# Patient Record
Sex: FEMALE | Race: WHITE | Employment: FULL TIME | URBAN - METROPOLITAN AREA
[De-identification: names, ages, dates, MRNs, and addresses within clinical notes are randomized per-mention and may not be internally consistent; named-entity substitution may affect disease eponyms.]

---

## 2017-04-26 ENCOUNTER — TELEPHONE (OUTPATIENT)
Dept: FAMILY MEDICINE CLINIC | Facility: CLINIC | Age: 47
End: 2017-04-26

## 2017-04-27 ENCOUNTER — TELEPHONE (OUTPATIENT)
Dept: FAMILY MEDICINE CLINIC | Facility: CLINIC | Age: 47
End: 2017-04-27

## 2017-05-12 ENCOUNTER — HOSPITAL ENCOUNTER (OUTPATIENT)
Dept: GENERAL RADIOLOGY | Age: 47
Discharge: HOME | End: 2017-05-12
Attending: FAMILY MEDICINE
Payer: COMMERCIAL

## 2017-05-12 ENCOUNTER — OFFICE VISIT (OUTPATIENT)
Dept: FAMILY MEDICINE CLINIC | Facility: CLINIC | Age: 47
End: 2017-05-12

## 2017-05-12 VITALS
HEIGHT: 69.5 IN | TEMPERATURE: 98 F | BODY MASS INDEX: 42.42 KG/M2 | DIASTOLIC BLOOD PRESSURE: 82 MMHG | HEART RATE: 72 BPM | WEIGHT: 293 LBS | SYSTOLIC BLOOD PRESSURE: 120 MMHG | RESPIRATION RATE: 16 BRPM

## 2017-05-12 DIAGNOSIS — Z15.01 BRCA2 GENE MUTATION POSITIVE: ICD-10-CM

## 2017-05-12 DIAGNOSIS — R05.8 PRODUCTIVE COUGH: ICD-10-CM

## 2017-05-12 DIAGNOSIS — Z01.818 PREOP EXAMINATION: ICD-10-CM

## 2017-05-12 DIAGNOSIS — Z15.09 BRCA2 GENE MUTATION POSITIVE: ICD-10-CM

## 2017-05-12 DIAGNOSIS — R74.8 ELEVATED ALKALINE PHOSPHATASE MEASUREMENT: ICD-10-CM

## 2017-05-12 DIAGNOSIS — Z01.818 PREOP EXAMINATION: Primary | ICD-10-CM

## 2017-05-12 PROBLEM — Z83.2 FAMILY HISTORY OF BLOOD DYSCRASIA: Status: ACTIVE | Noted: 2017-05-12

## 2017-05-12 PROCEDURE — 71020 XR CHEST PA + LAT CHEST (CPT=71020): CPT | Performed by: FAMILY MEDICINE

## 2017-05-12 PROCEDURE — 36415 COLL VENOUS BLD VENIPUNCTURE: CPT | Performed by: FAMILY MEDICINE

## 2017-05-12 PROCEDURE — 80053 COMPREHEN METABOLIC PANEL: CPT | Performed by: FAMILY MEDICINE

## 2017-05-12 PROCEDURE — 85730 THROMBOPLASTIN TIME PARTIAL: CPT | Performed by: FAMILY MEDICINE

## 2017-05-12 PROCEDURE — 93000 ELECTROCARDIOGRAM COMPLETE: CPT | Performed by: FAMILY MEDICINE

## 2017-05-12 PROCEDURE — 99244 OFF/OP CNSLTJ NEW/EST MOD 40: CPT | Performed by: FAMILY MEDICINE

## 2017-05-12 PROCEDURE — 84080 ASSAY ALKALINE PHOSPHATASES: CPT | Performed by: FAMILY MEDICINE

## 2017-05-12 PROCEDURE — 85610 PROTHROMBIN TIME: CPT | Performed by: FAMILY MEDICINE

## 2017-05-12 PROCEDURE — 85027 COMPLETE CBC AUTOMATED: CPT | Performed by: FAMILY MEDICINE

## 2017-05-12 RX ORDER — AZITHROMYCIN 250 MG/1
TABLET, FILM COATED ORAL
Qty: 6 TABLET | Refills: 0 | Status: SHIPPED | OUTPATIENT
Start: 2017-05-12 | End: 2017-05-17

## 2017-05-12 NOTE — PROGRESS NOTES
Marleni Hernandez is a 52year old female.     CC:  Patient presents with:  Pre-Op Exam: per pt      HPI:  I am seeing Marleni Hernandez for preoperative evaluation at the request of Dr. Zehra Gamino M.D. for my evaluation of the patient's medical problems BS+, soft, nondistended; no HSM; no masses; no bruits; no masses; nontender, no G/R/R   PSYCH: alert and oriented x 3; affect appropriate  SKIN: not examined  BREAST: not examined/not applicable  EXTREMITIES: No clubbing, cyanosis or edema  RECTAL: not exa I34.575 Encounter for othe*      COMPARISON:  None.      TECHNIQUE:  PA and lateral chest radiographs were obtained.      PATIENT STATED HISTORY: (As transcribed by Technologist)  Presurgical clearance no respiratory complaints at this time.  Having a hyste placement on 7/31/17 at [facility] by Chaitanya Galvez and Hong. Her health status is unchanged from initial preoperative consultation and is at an acceptable risk for surgery. . Please consider her cleared for this new proposed procedure.         Lucas Bonilla

## 2017-05-31 ENCOUNTER — TELEPHONE (OUTPATIENT)
Dept: FAMILY MEDICINE CLINIC | Facility: CLINIC | Age: 47
End: 2017-05-31

## 2017-06-16 ENCOUNTER — OFFICE VISIT (OUTPATIENT)
Dept: SURGERY | Facility: CLINIC | Age: 47
End: 2017-06-16

## 2017-06-16 VITALS
HEART RATE: 79 BPM | TEMPERATURE: 98 F | SYSTOLIC BLOOD PRESSURE: 150 MMHG | BODY MASS INDEX: 41.95 KG/M2 | WEIGHT: 293 LBS | DIASTOLIC BLOOD PRESSURE: 92 MMHG | HEIGHT: 70 IN

## 2017-06-16 DIAGNOSIS — Z15.01 BRCA2 GENE MUTATION POSITIVE: ICD-10-CM

## 2017-06-16 DIAGNOSIS — Z15.09 BRCA2 GENE MUTATION POSITIVE: ICD-10-CM

## 2017-06-16 DIAGNOSIS — Z01.818 PRE-OP TESTING: Primary | ICD-10-CM

## 2017-06-16 PROCEDURE — 99243 OFF/OP CNSLTJ NEW/EST LOW 30: CPT | Performed by: SURGERY

## 2017-06-16 RX ORDER — BUTALBITAL, ACETAMINOPHEN AND CAFFEINE 50; 325; 40 MG/1; MG/1; MG/1
1 TABLET ORAL EVERY 4 HOURS PRN
COMMUNITY
End: 2017-06-27 | Stop reason: ALTCHOICE

## 2017-06-16 RX ORDER — GARLIC EXTRACT 500 MG
1 CAPSULE ORAL DAILY
COMMUNITY
End: 2017-10-27

## 2017-06-16 RX ORDER — IBUPROFEN 600 MG/1
600 TABLET ORAL EVERY 6 HOURS PRN
COMMUNITY
End: 2017-06-27 | Stop reason: ALTCHOICE

## 2017-06-16 RX ORDER — AMOXICILLIN 250 MG
CAPSULE ORAL
COMMUNITY
End: 2017-10-27

## 2017-06-16 RX ORDER — ONDANSETRON 4 MG/1
4 TABLET, ORALLY DISINTEGRATING ORAL EVERY 8 HOURS PRN
COMMUNITY
End: 2017-06-27 | Stop reason: ALTCHOICE

## 2017-06-16 NOTE — CONSULTS
New Patient Consultation    Chief Complaint: BRCA 2 mutation  History of Present Illness:   Ramon Cardona is a 52year old female referred by Dr. Hector Sheppard to discuss autologous breast reconstruction.   The report the patient reports that her mother was [facility] Rfl:    Butalbital-APAP-Caffeine -40 MG Oral Tab Take 1 tablet by mouth every 4 (four) hours as needed for Pain. Disp:  Rfl:    ondansetron 4 MG Oral Tablet Dispersible Take 4 mg by mouth every 8 (eight) hours as needed for Nausea.  Disp:  Rfl: Genitourinary:  The patient denies frequent urination, needing to get up at night to urinate, urinary hesitancy or retaining urine, painful urination, urinary incontinence, decreased urine stream, blood in the urine, or vaginal/penile discharge.   Skin: respiratory effort. Cardiovascular: no cyanosis, no edema    Gastrointestinal: no hernias, infraumbilical pannus large, hyperectomy incision CDI    Lymphatic:  There is no cervical, supraclavicular, or axillary lymphadenopathy appreciated.     [address] a CTA to evaluate her perforators as well as her abdominal pannus thickness. She also undergo an MRI which was ordered by her previous breast surgeon which ideally will be done prior to seeing Dr. Kallie Melendez.        Furthermore we reviewed the options for post was discussed including the need for activity limitation, drains, and suture removal. Finally, we discussed the possible need for revisions, the process of nipple areolar reconstruction, and a contralateral balancing procedure (augmentation, mastopexy, red

## 2017-06-20 ENCOUNTER — TELEPHONE (OUTPATIENT)
Dept: FAMILY MEDICINE CLINIC | Facility: CLINIC | Age: 47
End: 2017-06-20

## 2017-06-26 ENCOUNTER — MED REC SCAN ONLY (OUTPATIENT)
Dept: FAMILY MEDICINE CLINIC | Facility: CLINIC | Age: 47
End: 2017-06-26

## 2017-06-27 ENCOUNTER — HOSPITAL ENCOUNTER (OUTPATIENT)
Dept: CT IMAGING | Facility: HOSPITAL | Age: 47
Discharge: HOME | End: 2017-06-27
Attending: SURGERY
Payer: COMMERCIAL

## 2017-06-27 ENCOUNTER — OFFICE VISIT (OUTPATIENT)
Dept: SURGERY | Facility: CLINIC | Age: 47
End: 2017-06-27

## 2017-06-27 VITALS
DIASTOLIC BLOOD PRESSURE: 92 MMHG | SYSTOLIC BLOOD PRESSURE: 144 MMHG | HEART RATE: 71 BPM | WEIGHT: 293 LBS | RESPIRATION RATE: 20 BRPM | BODY MASS INDEX: 42 KG/M2 | OXYGEN SATURATION: 96 % | TEMPERATURE: 98 F

## 2017-06-27 DIAGNOSIS — Z15.09 BRCA2 GENE MUTATION POSITIVE: Primary | ICD-10-CM

## 2017-06-27 DIAGNOSIS — F41.9 ANXIETY: ICD-10-CM

## 2017-06-27 DIAGNOSIS — Z15.01 BRCA2 GENE MUTATION POSITIVE: Primary | ICD-10-CM

## 2017-06-27 DIAGNOSIS — Z01.818 PRE-OP TESTING: ICD-10-CM

## 2017-06-27 PROCEDURE — 74174 CTA ABD&PLVS W/CONTRAST: CPT | Performed by: SURGERY

## 2017-06-27 PROCEDURE — 99245 OFF/OP CONSLTJ NEW/EST HI 55: CPT | Performed by: SURGERY

## 2017-06-27 RX ORDER — DIAZEPAM 5 MG/1
TABLET ORAL EVERY 8 HOURS PRN
Qty: 5 TABLET | Refills: 0 | Status: SHIPPED | OUTPATIENT
Start: 2017-06-27 | End: 2017-07-24 | Stop reason: ALTCHOICE

## 2017-06-29 NOTE — PROGRESS NOTES
Breast Surgery New Patient Consultation    This is the first visit for this 52year old woman, referred by Dr. Natividad Velasco, who presents for evaluation of BRCA2 positive.     History of Present Illness:   Ms. Myra Abad is a 52year old woman who presents w total) by mouth every 8 (eight) hours as needed for Anxiety. Disp: 5 tablet Rfl: 0   Multiple Vitamins-Minerals (BIOTIN PLUS/CALCIUM/VIT D3) Oral Tab Take by mouth. Disp:  Rfl:    Bromelains 100 MG Oral Tab Take by mouth.  Disp:  Rfl:    Acidophilus/Pectin of the skin, indigestion, nausea, change in bowel habits, diarrhea, abdominal pain or vomiting blood.      Genitourinary:  The patient denies frequent urination, needing to get up at night to urinate, urinary hesitancy or retaining urine, painful urination, Conjunctiva are clear, non-icteric. Chest: The chest expands symmetrically. The lungs are clear to auscultation. Heart: The rhythm is regular. There are no murmurs, rubs, gallops or thrills.     Breasts:  Her breasts are symmetrical with a cup size F MRI results when they are available and her surgery will be planned according to the weight loss goals. The risks and possible complications of the procedure were explained to the patient and her family and she understood and agreed to the proposed plan.  Leo Morris

## 2017-07-05 ENCOUNTER — TELEPHONE (OUTPATIENT)
Dept: SURGERY | Facility: CLINIC | Age: 47
End: 2017-07-05

## 2017-07-05 NOTE — TELEPHONE ENCOUNTER
I spoke to the pt today discussing the CTA results. Her flap thickness is ranging from 5.3-7.6cm and that is increasing the risk of fat necrosis, wound dehiscence, pulling on the flap and anstomosis and heaviness of her reconstructed breasts.    I therefore

## 2017-07-13 ENCOUNTER — TELEPHONE (OUTPATIENT)
Dept: SURGERY | Facility: CLINIC | Age: 47
End: 2017-07-13

## 2017-07-13 ENCOUNTER — TELEPHONE (OUTPATIENT)
Dept: FAMILY MEDICINE CLINIC | Facility: CLINIC | Age: 47
End: 2017-07-13

## 2017-07-13 NOTE — TELEPHONE ENCOUNTER
Seen on 06/01/17 for Hysterectomy and is BRCA 2 +. Is having a double Mastectomy and expanders on 07/31/17 at [facility] by Dr Oneida France and Dr Anselmo Hall. Can you put in an updated H&P into the system clearing her for this surgery.  Surgeon states that her

## 2017-07-13 NOTE — TELEPHONE ENCOUNTER
Called pt to confirm surgery date of 7/31/17 at [facility] for Ruddy. Pippa Olmos and [employer] Answered pt questions. She will come in on 7/24/17 to sign Plastic Surgery consents with RN/MA. Pt knows she needs to obtain pre-op H&P and medical clearance for surgery.  L

## 2017-07-13 NOTE — TELEPHONE ENCOUNTER
Spoke with Larry Ortiz at Dr. Chika Mallory office - she is going to check into wether patient needs to be cleared again or if the H & P from 5 12 2017 - can be updated

## 2017-07-13 NOTE — TELEPHONE ENCOUNTER
She was last seen by me on 5/12/17. Will the surgeon and the surgical facility accept that it will be over 2 months since the last OV. I can update my note if everyone is fine with it.

## 2017-07-13 NOTE — TELEPHONE ENCOUNTER
Emelyn Wren called back the office and she states that it is fine to update the patients H& P from 5 12 2017 -  The procedure just needs to be updated - patient is having:  Bilateral Mastectomy-   Immediate Bilateral Breast Reconstruction and tissue expander place

## 2017-07-17 ENCOUNTER — TELEPHONE (OUTPATIENT)
Dept: SURGERY | Facility: CLINIC | Age: 47
End: 2017-07-17

## 2017-07-17 NOTE — TELEPHONE ENCOUNTER
Patient calling with request to secure a December date for second stage surgery for delayed autologous reconstruction. Per Dr. Loren Cole, we will wait to hold any dates at this time.  Answered pt questions and she will discuss with Dr. Loren Cole at first post op rider

## 2017-07-24 ENCOUNTER — NURSE ONLY (OUTPATIENT)
Dept: SURGERY | Facility: CLINIC | Age: 47
End: 2017-07-24

## 2017-07-24 ENCOUNTER — TELEPHONE (OUTPATIENT)
Dept: FAMILY MEDICINE CLINIC | Facility: CLINIC | Age: 47
End: 2017-07-24

## 2017-07-24 NOTE — OR PREOP
Spoke with Mary Douglas RN with Dr. Mavis Melgar, states it is acceptable to  start IV in either upper extremity.

## 2017-07-24 NOTE — PROGRESS NOTES
Faxed EPIC request for medical clearance to Dr. Mili Silva' office, fax confirmation page received. I will anticipate the clearance.

## 2017-07-24 NOTE — TELEPHONE ENCOUNTER
Provider called, she cannot see the last EKG for pt. Can we fax her a copy at [phone]?   Please call Arianna Espinal at [phone]

## 2017-07-24 NOTE — PROGRESS NOTES
The patient presents today for a pre-op appointment. Surgery and wash instructions verbally reviewed with the patient and she was provided with written instructions.  Informed consent for the procedure reviewed and signed by the patient in the office, all q

## 2017-07-26 ENCOUNTER — TELEPHONE (OUTPATIENT)
Dept: SURGERY | Facility: CLINIC | Age: 47
End: 2017-07-26

## 2017-07-26 DIAGNOSIS — K59.03 DRUG-INDUCED CONSTIPATION: ICD-10-CM

## 2017-07-26 DIAGNOSIS — G89.18 POST-OP PAIN: Primary | ICD-10-CM

## 2017-07-26 RX ORDER — HYDROCODONE BITARTRATE AND ACETAMINOPHEN 5; 325 MG/1; MG/1
1-2 TABLET ORAL EVERY 4 HOURS PRN
Qty: 30 TABLET | Refills: 0 | Status: ON HOLD | OUTPATIENT
Start: 2017-07-26 | End: 2017-08-01

## 2017-07-26 RX ORDER — DOCUSATE SODIUM 100 MG/1
100 CAPSULE, LIQUID FILLED ORAL 2 TIMES DAILY
Qty: 60 CAPSULE | Refills: 0 | Status: SHIPPED | OUTPATIENT
Start: 2017-07-26 | End: 2017-09-18 | Stop reason: ALTCHOICE

## 2017-07-26 NOTE — PROGRESS NOTES
Pre-op medications for Norco #30 and Colace #60 ordered in preparation for the patient's procedure this week per Dr. Aye BARRERA.

## 2017-07-26 NOTE — TELEPHONE ENCOUNTER
Pre-op medications for Norco #30 and Colace #60 ordered in preparation for the patient's procedure this week per Dr. Tomas BARRERA.    The patient was notified and preferred to have these medication orders mailed to her, confirmed her home address and advised

## 2017-07-31 ENCOUNTER — SURGERY (OUTPATIENT)
Age: 47
End: 2017-07-31

## 2017-07-31 ENCOUNTER — HOSPITAL ENCOUNTER (INPATIENT)
Facility: HOSPITAL | Age: 47
LOS: 2 days | Discharge: HOME | DRG: 584 | End: 2017-08-02
Attending: SURGERY | Admitting: SURGERY
Payer: COMMERCIAL

## 2017-07-31 ENCOUNTER — ANESTHESIA (OUTPATIENT)
Dept: SURGERY | Facility: HOSPITAL | Age: 47
DRG: 584 | End: 2017-07-31
Payer: COMMERCIAL

## 2017-07-31 ENCOUNTER — ANESTHESIA EVENT (OUTPATIENT)
Dept: SURGERY | Facility: HOSPITAL | Age: 47
DRG: 584 | End: 2017-07-31
Payer: COMMERCIAL

## 2017-07-31 DIAGNOSIS — Z15.01 BRCA2 GENE MUTATION POSITIVE: Primary | ICD-10-CM

## 2017-07-31 DIAGNOSIS — Z15.09 BRCA2 GENE MUTATION POSITIVE: Primary | ICD-10-CM

## 2017-07-31 DIAGNOSIS — G89.18 POST-OP PAIN: ICD-10-CM

## 2017-07-31 PROBLEM — Z90.13 ABSENCE OF BOTH BREASTS: Status: ACTIVE | Noted: 2017-07-31

## 2017-07-31 PROCEDURE — 88305 TISSUE EXAM BY PATHOLOGIST: CPT | Performed by: SURGERY

## 2017-07-31 PROCEDURE — 88307 TISSUE EXAM BY PATHOLOGIST: CPT | Performed by: SURGERY

## 2017-07-31 PROCEDURE — 0HRV0JZ REPLACEMENT OF BILATERAL BREAST WITH SYNTHETIC SUBSTITUTE, OPEN APPROACH: ICD-10-PCS | Performed by: SURGERY

## 2017-07-31 DEVICE — GRAFT ALLODERM CONTOUR LG PERF: Type: IMPLANTABLE DEVICE | Site: BREAST | Status: FUNCTIONAL

## 2017-07-31 DEVICE — IMPLANTABLE DEVICE: Type: IMPLANTABLE DEVICE | Site: BREAST | Status: FUNCTIONAL

## 2017-07-31 RX ORDER — HYDROCODONE BITARTRATE AND ACETAMINOPHEN 5; 325 MG/1; MG/1
1 TABLET ORAL EVERY 6 HOURS PRN
Status: DISCONTINUED | OUTPATIENT
Start: 2017-07-31 | End: 2017-08-01

## 2017-07-31 RX ORDER — DEXAMETHASONE SODIUM PHOSPHATE 4 MG/ML
VIAL (ML) INJECTION AS NEEDED
Status: DISCONTINUED | OUTPATIENT
Start: 2017-07-31 | End: 2017-07-31 | Stop reason: SURG

## 2017-07-31 RX ORDER — ACETAMINOPHEN 325 MG/1
650 TABLET ORAL ONCE
Status: COMPLETED | OUTPATIENT
Start: 2017-07-31 | End: 2017-07-31

## 2017-07-31 RX ORDER — SODIUM CHLORIDE, SODIUM LACTATE, POTASSIUM CHLORIDE, CALCIUM CHLORIDE 600; 310; 30; 20 MG/100ML; MG/100ML; MG/100ML; MG/100ML
INJECTION, SOLUTION INTRAVENOUS CONTINUOUS
Status: DISCONTINUED | OUTPATIENT
Start: 2017-07-31 | End: 2017-08-02

## 2017-07-31 RX ORDER — DOCUSATE SODIUM 100 MG/1
100 CAPSULE, LIQUID FILLED ORAL 2 TIMES DAILY
Status: DISCONTINUED | OUTPATIENT
Start: 2017-07-31 | End: 2017-08-02

## 2017-07-31 RX ORDER — METHYLENE BLUE 10 MG/ML
INJECTION INTRAVENOUS AS NEEDED
Status: DISCONTINUED | OUTPATIENT
Start: 2017-07-31 | End: 2017-07-31 | Stop reason: HOSPADM

## 2017-07-31 RX ORDER — BUPIVACAINE HYDROCHLORIDE 5 MG/ML
INJECTION, SOLUTION EPIDURAL; INTRACAUDAL AS NEEDED
Status: DISCONTINUED | OUTPATIENT
Start: 2017-07-31 | End: 2017-07-31 | Stop reason: HOSPADM

## 2017-07-31 RX ORDER — NALOXONE HYDROCHLORIDE 0.4 MG/ML
80 INJECTION, SOLUTION INTRAMUSCULAR; INTRAVENOUS; SUBCUTANEOUS AS NEEDED
Status: ACTIVE | OUTPATIENT
Start: 2017-07-31 | End: 2017-07-31

## 2017-07-31 RX ORDER — NEOSTIGMINE METHYLSULFATE 0.5 MG/ML
INJECTION INTRAVENOUS AS NEEDED
Status: DISCONTINUED | OUTPATIENT
Start: 2017-07-31 | End: 2017-07-31 | Stop reason: SURG

## 2017-07-31 RX ORDER — HYDROMORPHONE HYDROCHLORIDE 1 MG/ML
0.2 INJECTION, SOLUTION INTRAMUSCULAR; INTRAVENOUS; SUBCUTANEOUS EVERY 5 MIN PRN
Status: DISCONTINUED | OUTPATIENT
Start: 2017-07-31 | End: 2017-07-31 | Stop reason: HOSPADM

## 2017-07-31 RX ORDER — MORPHINE SULFATE 4 MG/ML
6 INJECTION, SOLUTION INTRAMUSCULAR; INTRAVENOUS EVERY 10 MIN PRN
Status: DISCONTINUED | OUTPATIENT
Start: 2017-07-31 | End: 2017-07-31 | Stop reason: HOSPADM

## 2017-07-31 RX ORDER — HYDROMORPHONE HYDROCHLORIDE 1 MG/ML
0.6 INJECTION, SOLUTION INTRAMUSCULAR; INTRAVENOUS; SUBCUTANEOUS EVERY 5 MIN PRN
Status: DISCONTINUED | OUTPATIENT
Start: 2017-07-31 | End: 2017-07-31 | Stop reason: HOSPADM

## 2017-07-31 RX ORDER — METOCLOPRAMIDE 10 MG/1
10 TABLET ORAL ONCE
Status: COMPLETED | OUTPATIENT
Start: 2017-07-31 | End: 2017-07-31

## 2017-07-31 RX ORDER — ENOXAPARIN SODIUM 100 MG/ML
40 INJECTION SUBCUTANEOUS DAILY
Status: DISCONTINUED | OUTPATIENT
Start: 2017-08-01 | End: 2017-08-02

## 2017-07-31 RX ORDER — ONDANSETRON 2 MG/ML
INJECTION INTRAMUSCULAR; INTRAVENOUS AS NEEDED
Status: DISCONTINUED | OUTPATIENT
Start: 2017-07-31 | End: 2017-07-31 | Stop reason: SURG

## 2017-07-31 RX ORDER — HYDROMORPHONE HYDROCHLORIDE 1 MG/ML
0.4 INJECTION, SOLUTION INTRAMUSCULAR; INTRAVENOUS; SUBCUTANEOUS EVERY 5 MIN PRN
Status: DISCONTINUED | OUTPATIENT
Start: 2017-07-31 | End: 2017-07-31 | Stop reason: HOSPADM

## 2017-07-31 RX ORDER — MORPHINE SULFATE 2 MG/ML
2 INJECTION, SOLUTION INTRAMUSCULAR; INTRAVENOUS EVERY 10 MIN PRN
Status: DISCONTINUED | OUTPATIENT
Start: 2017-07-31 | End: 2017-07-31 | Stop reason: HOSPADM

## 2017-07-31 RX ORDER — HYDROCODONE BITARTRATE AND ACETAMINOPHEN 5; 325 MG/1; MG/1
1 TABLET ORAL AS NEEDED
Status: DISCONTINUED | OUTPATIENT
Start: 2017-07-31 | End: 2017-07-31 | Stop reason: HOSPADM

## 2017-07-31 RX ORDER — CEPHALEXIN 500 MG/1
500 CAPSULE ORAL EVERY 6 HOURS SCHEDULED
Status: DISCONTINUED | OUTPATIENT
Start: 2017-08-01 | End: 2017-08-02

## 2017-07-31 RX ORDER — HYDROCODONE BITARTRATE AND ACETAMINOPHEN 5; 325 MG/1; MG/1
2 TABLET ORAL AS NEEDED
Status: DISCONTINUED | OUTPATIENT
Start: 2017-07-31 | End: 2017-07-31 | Stop reason: HOSPADM

## 2017-07-31 RX ORDER — MORPHINE SULFATE 4 MG/ML
4 INJECTION, SOLUTION INTRAMUSCULAR; INTRAVENOUS EVERY 10 MIN PRN
Status: DISCONTINUED | OUTPATIENT
Start: 2017-07-31 | End: 2017-07-31 | Stop reason: HOSPADM

## 2017-07-31 RX ORDER — ONDANSETRON 2 MG/ML
4 INJECTION INTRAMUSCULAR; INTRAVENOUS ONCE AS NEEDED
Status: DISCONTINUED | OUTPATIENT
Start: 2017-07-31 | End: 2017-07-31 | Stop reason: HOSPADM

## 2017-07-31 RX ORDER — MIDAZOLAM HYDROCHLORIDE 1 MG/ML
INJECTION INTRAMUSCULAR; INTRAVENOUS AS NEEDED
Status: DISCONTINUED | OUTPATIENT
Start: 2017-07-31 | End: 2017-07-31 | Stop reason: SURG

## 2017-07-31 RX ORDER — GLYCOPYRROLATE 0.2 MG/ML
INJECTION INTRAMUSCULAR; INTRAVENOUS AS NEEDED
Status: DISCONTINUED | OUTPATIENT
Start: 2017-07-31 | End: 2017-07-31 | Stop reason: SURG

## 2017-07-31 RX ORDER — FAMOTIDINE 20 MG/1
20 TABLET ORAL ONCE
Status: COMPLETED | OUTPATIENT
Start: 2017-07-31 | End: 2017-07-31

## 2017-07-31 RX ORDER — KETAMINE HCL IN 0.9 % NACL 100MG/10ML
SYRINGE (ML) INTRAVENOUS AS NEEDED
Status: DISCONTINUED | OUTPATIENT
Start: 2017-07-31 | End: 2017-07-31 | Stop reason: SURG

## 2017-07-31 RX ORDER — LIDOCAINE HYDROCHLORIDE 10 MG/ML
INJECTION, SOLUTION EPIDURAL; INFILTRATION; INTRACAUDAL; PERINEURAL AS NEEDED
Status: DISCONTINUED | OUTPATIENT
Start: 2017-07-31 | End: 2017-07-31 | Stop reason: SURG

## 2017-07-31 RX ORDER — ROCURONIUM BROMIDE 10 MG/ML
INJECTION, SOLUTION INTRAVENOUS AS NEEDED
Status: DISCONTINUED | OUTPATIENT
Start: 2017-07-31 | End: 2017-07-31 | Stop reason: SURG

## 2017-07-31 RX ORDER — ONDANSETRON 2 MG/ML
4 INJECTION INTRAMUSCULAR; INTRAVENOUS EVERY 6 HOURS PRN
Status: DISCONTINUED | OUTPATIENT
Start: 2017-07-31 | End: 2017-08-02

## 2017-07-31 RX ADMIN — ROCURONIUM BROMIDE 20 MG: 10 INJECTION, SOLUTION INTRAVENOUS at 09:15:00

## 2017-07-31 RX ADMIN — DEXAMETHASONE SODIUM PHOSPHATE 4 MG: 4 MG/ML VIAL (ML) INJECTION at 08:09:00

## 2017-07-31 RX ADMIN — KETAMINE HCL IN 0.9 % NACL 30 MG: 100MG/10ML SYRINGE (ML) INTRAVENOUS at 08:14:00

## 2017-07-31 RX ADMIN — SODIUM CHLORIDE, SODIUM LACTATE, POTASSIUM CHLORIDE, CALCIUM CHLORIDE: 600; 310; 30; 20 INJECTION, SOLUTION INTRAVENOUS at 13:09:00

## 2017-07-31 RX ADMIN — ONDANSETRON 4 MG: 2 INJECTION INTRAMUSCULAR; INTRAVENOUS at 08:09:00

## 2017-07-31 RX ADMIN — SODIUM CHLORIDE, SODIUM LACTATE, POTASSIUM CHLORIDE, CALCIUM CHLORIDE: 600; 310; 30; 20 INJECTION, SOLUTION INTRAVENOUS at 13:05:00

## 2017-07-31 RX ADMIN — SODIUM CHLORIDE, SODIUM LACTATE, POTASSIUM CHLORIDE, CALCIUM CHLORIDE: 600; 310; 30; 20 INJECTION, SOLUTION INTRAVENOUS at 08:02:00

## 2017-07-31 RX ADMIN — MIDAZOLAM HYDROCHLORIDE 2 MG: 1 INJECTION INTRAMUSCULAR; INTRAVENOUS at 08:03:00

## 2017-07-31 RX ADMIN — LIDOCAINE HYDROCHLORIDE 50 MG: 10 INJECTION, SOLUTION EPIDURAL; INFILTRATION; INTRACAUDAL; PERINEURAL at 08:09:00

## 2017-07-31 RX ADMIN — SODIUM CHLORIDE, SODIUM LACTATE, POTASSIUM CHLORIDE, CALCIUM CHLORIDE: 600; 310; 30; 20 INJECTION, SOLUTION INTRAVENOUS at 11:00:00

## 2017-07-31 RX ADMIN — SODIUM CHLORIDE, SODIUM LACTATE, POTASSIUM CHLORIDE, CALCIUM CHLORIDE: 600; 310; 30; 20 INJECTION, SOLUTION INTRAVENOUS at 08:45:00

## 2017-07-31 RX ADMIN — GLYCOPYRROLATE 0.6 MG: 0.2 INJECTION INTRAMUSCULAR; INTRAVENOUS at 12:50:00

## 2017-07-31 RX ADMIN — ROCURONIUM BROMIDE 50 MG: 10 INJECTION, SOLUTION INTRAVENOUS at 08:09:00

## 2017-07-31 RX ADMIN — NEOSTIGMINE METHYLSULFATE 3 MG: 0.5 INJECTION INTRAVENOUS at 12:50:00

## 2017-07-31 NOTE — H&P
History of Present Illness:   Ms. Silvina May is a 52year old woman who presents with a family history of breast cancer in her mother who was diagnosed recently.  This triggered a genetic workup and the patient tested positive for BRCA2 gene mutation mouth. Disp:  Rfl:    Bromelains 100 MG Oral Tab Take by mouth. Disp:  Rfl:    Acidophilus/Pectin Oral Cap Take 1 capsule by mouth daily.  Disp:  Rfl:       Allergies:    No Known Allergies     Family History:          Family History   Problem Relation Age patient denies frequent urination, needing to get up at night to urinate, urinary hesitancy or retaining urine, painful urination, urinary incontinence, decreased urine stream, blood in the urine or vaginal/penile discharge.     Skin:    The patient denies auscultation.     Heart: The rhythm is regular. There are no murmurs, rubs, gallops or thrills.     Breasts:  Her breasts are symmetrical with a cup size F.   Right breast: The skin, nipple ,and areola appear normal. There is no skin dimpling with movement loss goals. The risks and possible complications of the procedure were explained to the patient and her family and she understood and agreed to the proposed plan.  She was given ample opportunity for questions and those questions were answered to her satisf

## 2017-07-31 NOTE — ANESTHESIA POSTPROCEDURE EVALUATION
Patient: Floyde Dose    Procedure Summary     Date:  07/31/17 Room / Location:  [address] [place] [address] OR 04 / [address] [place] [address] OR    Anesthesia Start:  0802 Anesthesia Stop:      Procedures:       BREAST SIMPLE MASTECTOMY (Bilateral Breast)      BREAST RECONSTRUCTION/

## 2017-07-31 NOTE — BRIEF OP NOTE
Pre-Operative Diagnosis: BRCA2 gene mutation positive [Z15.01, Z15.02]     Post-Operative Diagnosis: BRCA2 gene mutation positive [Z15.01, Z15.02]     Procedure Performed:   Procedure(s):  bilateral skin sparing mastectomies   Immediate bilateral breast

## 2017-07-31 NOTE — ANESTHESIA PREPROCEDURE EVALUATION
Anesthesia PreOp Note    HPI:     Sadie Keith is a 52year old female who presents for preoperative consultation requested by: Bethel Odom MD    Date of Surgery: 7/31/2017    Procedure(s):  BREAST SIMPLE MASTECTOMY  BREAST RECONSTRUCTION/ I at Unknown time       Current Facility-Administered Medications Ordered in Epic:  lactated ringers infusion  Intravenous Continuous Arline Mireles MD Last Rate: 20 mL/hr at 07/31/17 0706   CeFAZolin Sodium (ANCEF) 3 g in sodium chloride 0.9% 100 mL IVP (289 lb 4 oz). Her oral temperature is 97.4 °F (36.3 °C) (abnormal). Her blood pressure is 142/82 and her pulse is 66. Her respiration is 20 and oxygen saturation is 98%.     07/24/17  1208 07/31/17  0616 07/31/17  0621   BP:  142/82    Pulse:  66    Resp:

## 2017-08-01 RX ORDER — CEPHALEXIN 500 MG/1
500 CAPSULE ORAL EVERY 6 HOURS SCHEDULED
Qty: 28 CAPSULE | Refills: 0 | Status: SHIPPED | OUTPATIENT
Start: 2017-08-01 | End: 2017-08-08

## 2017-08-01 RX ORDER — HYDROCODONE BITARTRATE AND ACETAMINOPHEN 5; 325 MG/1; MG/1
1-2 TABLET ORAL EVERY 6 HOURS PRN
Qty: 50 TABLET | Refills: 0 | Status: SHIPPED | OUTPATIENT
Start: 2017-08-01 | End: 2017-08-09

## 2017-08-01 RX ORDER — HYDROCODONE BITARTRATE AND ACETAMINOPHEN 5; 325 MG/1; MG/1
1-2 TABLET ORAL EVERY 6 HOURS PRN
Status: DISCONTINUED | OUTPATIENT
Start: 2017-08-01 | End: 2017-08-02

## 2017-08-01 RX ORDER — 0.9 % SODIUM CHLORIDE 0.9 %
VIAL (ML) INJECTION
Status: DISPENSED
Start: 2017-08-01 | End: 2017-08-01

## 2017-08-01 RX ORDER — BUTALBITAL, ACETAMINOPHEN AND CAFFEINE 50; 325; 40 MG/1; MG/1; MG/1
1 TABLET ORAL EVERY 4 HOURS PRN
Status: DISCONTINUED | OUTPATIENT
Start: 2017-08-01 | End: 2017-08-02

## 2017-08-01 NOTE — OPERATIVE REPORT
[facility]    PATIENT'S NAME: Marie Currangartner GERMANIA   ATTENDING PHYSICIAN: Moncho Astudillo. Demetria Teague MD   OPERATING PHYSICIAN: Tete Kidd MD   PATIENT ACCOUNT#:   [id number]    LOCATION:  [address] [place] [address] #:   [id number]       DATE OF answered. The patient understands and wishes to proceed. Questions were answered. PROCEDURE:  The patient was identified in the preoperative area, sites were marked,  and consent was obtained.   The patient was then brought back to the operating room pectoralis muscle were sutured together with 2-0 PDS sutures. Then, 800 mL of air were filled temporarily into the expander. In a similar fashion, the skin envelope was tailor tacked in a Wise skin excision pattern.   Then, 2 TANNA suction drains were placed [address]  [id number]  [facility]/

## 2017-08-01 NOTE — OPERATIVE REPORT
[facility]    PATIENT'S NAME: Kristian Jessica SOLO   ATTENDING PHYSICIAN: Rahel Tiwari. Antonieta Santizo MD   OPERATING PHYSICIAN: Rahel Tiwari.  Antonieta Santizo MD   PATIENT ACCOUNT#:   [id number]    LOCATION:  [address] [place] [address] #:   [id number]       D to the proposed surgery. PROCEDURE:  The patient was brought to the operating room, placed in a supine position. She was properly padded and secured.   She was given a dose of IV antibiotics, and sequential compression devices were applied to her legs f with a stitch at the axillary tail and sent for pathologic evaluation labeled as right skin-sparing mastectomy, stitch marks the axillary tail. The wound was irrigated with warm sterile saline. Hemostasis was assured with electrocautery and hemoclips.   A

## 2017-08-01 NOTE — PROGRESS NOTES
[facility]  Progress Note    Tip Sawant Patient Status:  Inpatient    1970 MRN [id number]   Location [facility] 4W/SW/SE Attending Mushtaq Nicholas MD   Hosp Day # 1 PCP Randy Jackson MD     Subjective:  Patient doing well

## 2017-08-01 NOTE — PLAN OF CARE
Absence of fever/infection during anticipated neutropenic period Not Progressing    Pt had low grade temp today and was instructed to cough and deep breathe and to increase activity as tolerated by ambulating in the halls.  Spoke with Dr. Fadi Flores about this

## 2017-08-02 VITALS
RESPIRATION RATE: 18 BRPM | WEIGHT: 289.25 LBS | DIASTOLIC BLOOD PRESSURE: 73 MMHG | SYSTOLIC BLOOD PRESSURE: 132 MMHG | HEART RATE: 80 BPM | OXYGEN SATURATION: 92 % | TEMPERATURE: 99 F | BODY MASS INDEX: 40.49 KG/M2 | HEIGHT: 71 IN

## 2017-08-02 DIAGNOSIS — R11.0 NAUSEA: ICD-10-CM

## 2017-08-02 DIAGNOSIS — Z79.2 PROPHYLACTIC ANTIBIOTIC: Primary | ICD-10-CM

## 2017-08-02 RX ORDER — CEPHALEXIN 500 MG/1
500 CAPSULE ORAL 4 TIMES DAILY
Qty: 40 CAPSULE | Refills: 0 | Status: SHIPPED | OUTPATIENT
Start: 2017-08-02 | End: 2017-09-18 | Stop reason: ALTCHOICE

## 2017-08-02 RX ORDER — 0.9 % SODIUM CHLORIDE 0.9 %
VIAL (ML) INJECTION
Status: COMPLETED
Start: 2017-08-02 | End: 2017-08-02

## 2017-08-02 RX ORDER — HYDROCODONE BITARTRATE AND ACETAMINOPHEN 5; 325 MG/1; MG/1
1-2 TABLET ORAL EVERY 6 HOURS PRN
Qty: 50 TABLET | Refills: 0 | Status: SHIPPED | OUTPATIENT
Start: 2017-08-02 | End: 2017-09-18 | Stop reason: ALTCHOICE

## 2017-08-02 RX ORDER — ONDANSETRON 4 MG/1
4 TABLET, FILM COATED ORAL EVERY 6 HOURS PRN
Qty: 28 TABLET | Refills: 0 | Status: SHIPPED | OUTPATIENT
Start: 2017-08-02 | End: 2017-09-18 | Stop reason: ALTCHOICE

## 2017-08-02 NOTE — PROGRESS NOTES
Keflex and Zofran orders placed per Dr. Mayo Hernandez. Orders sent electronically to the patient's preferred pharmacy. The patient was notified.

## 2017-08-02 NOTE — PROGRESS NOTES
HPI:    Patient ID: All Tejada is a 52year old female. HPI    Review of Systems         Current Outpatient Prescriptions:  cephALEXin (KEFLEX) 500 MG Oral Cap Take 1 capsule (500 mg total) by mouth 4 (four) times daily.  Disp: 40 capsule Rfl:

## 2017-08-03 NOTE — DISCHARGE SUMMARY
Admit date: (7/31/17)  Discharge date: (8/2/17) dates    The patient is a 49-year-old female who was admitted postoperatively status post bilateral mastectomies with immediate tissue expander reconstruction for her known BRCA mutation and high risk for the

## 2017-08-04 ENCOUNTER — TELEPHONE (OUTPATIENT)
Dept: SURGERY | Facility: CLINIC | Age: 47
End: 2017-08-04

## 2017-08-04 NOTE — TELEPHONE ENCOUNTER
Discussed with Dr Dougie Bennett, advised patient to take Motrin 600 mg every 8 hours in addition to the norco.   Pt verbalized understanding, and will start today.    Reviewed to take it with food, and discussed to continue stool softener, and she is eating smooth

## 2017-08-04 NOTE — TELEPHONE ENCOUNTER
Pt phoned in requesting a switch in her pain medicine. She is currently taking Norco 5/325 1 tablet every 3 hours, and rates pain about \"6\" on pain scale. Reports that she is \"very uncomfortable. \"  Denies nausea.   No BM since before surgery, taking co

## 2017-08-08 ENCOUNTER — NURSE ONLY (OUTPATIENT)
Dept: SURGERY | Facility: CLINIC | Age: 47
End: 2017-08-08

## 2017-08-08 ENCOUNTER — OFFICE VISIT (OUTPATIENT)
Dept: SURGERY | Facility: CLINIC | Age: 47
End: 2017-08-08

## 2017-08-08 VITALS
TEMPERATURE: 99 F | WEIGHT: 282 LBS | DIASTOLIC BLOOD PRESSURE: 84 MMHG | OXYGEN SATURATION: 95 % | HEART RATE: 66 BPM | BODY MASS INDEX: 39.48 KG/M2 | HEIGHT: 71 IN | RESPIRATION RATE: 18 BRPM | SYSTOLIC BLOOD PRESSURE: 128 MMHG

## 2017-08-08 DIAGNOSIS — Z15.09 BRCA2 GENE MUTATION POSITIVE: Primary | ICD-10-CM

## 2017-08-08 DIAGNOSIS — Z15.01 BRCA2 GENE MUTATION POSITIVE: Primary | ICD-10-CM

## 2017-08-08 PROCEDURE — 99024 POSTOP FOLLOW-UP VISIT: CPT | Performed by: SURGERY

## 2017-08-08 NOTE — PROGRESS NOTES
The patient presents postoperatively, s/p:  Reconstruction of left breast with tissue expander placement. Acellular dermal matrix placement. Lateral capsulorrhaphy and Paz pattern skin excision, 22 modifier.   Reconstruction of right breast with tissue e

## 2017-08-09 NOTE — PROGRESS NOTES
Breast Surgery Post-Operative Visit    Diagnosis: BRCA2 positive status post bilateral prophylactic skin sparing mastectomies with immediate tissue expander reconstruction on July 31, 2017.     Stage: N/A    Disease Status:  Surgical treatment complete, no 850 mL tissue expander   07/31/2017: MASTECTOMY LEFT Left  07/31/2017: MASTECTOMY RIGHT Right  No date: OTHER      Comment: Exploratory Lap  6/1/2017: OTHER SURGICAL HISTORY      Comment: Total abdominal hysterectomy with                salpingectomy  No d weakness, change in appetite or weight loss. HEENT:     The patient denies eye irritation, cataracts, redness, glaucoma, yellowing of the eyes, change in vision or color blindness.  The patient denies hearing loss, ringing in the ears, ear drainage, eara dizziness, memory problems, loss of sensation/numbness, problems walking, weakness, tingling or burning in hands/feet. There is no history of abusive relationship, bipolar disorder, sleep disturbance, anxiety, depression or feeling of despair.     Endocrine office with any questions or concerns prior to her next scheduled appointment.

## 2017-08-16 ENCOUNTER — OFFICE VISIT (OUTPATIENT)
Dept: SURGERY | Facility: CLINIC | Age: 47
End: 2017-08-16

## 2017-08-16 VITALS
WEIGHT: 282 LBS | HEART RATE: 80 BPM | DIASTOLIC BLOOD PRESSURE: 92 MMHG | TEMPERATURE: 99 F | SYSTOLIC BLOOD PRESSURE: 145 MMHG | BODY MASS INDEX: 39 KG/M2

## 2017-08-16 DIAGNOSIS — Z90.13 ABSENCE OF BOTH BREASTS: ICD-10-CM

## 2017-08-16 DIAGNOSIS — Z15.01 BRCA2 GENE MUTATION POSITIVE: Primary | ICD-10-CM

## 2017-08-16 DIAGNOSIS — Z79.2 PROPHYLACTIC ANTIBIOTIC: Primary | ICD-10-CM

## 2017-08-16 DIAGNOSIS — Z15.09 BRCA2 GENE MUTATION POSITIVE: Primary | ICD-10-CM

## 2017-08-16 PROCEDURE — 99024 POSTOP FOLLOW-UP VISIT: CPT | Performed by: SURGERY

## 2017-08-16 RX ORDER — CEPHALEXIN 500 MG/1
500 CAPSULE ORAL 4 TIMES DAILY
Qty: 40 CAPSULE | Refills: 0 | Status: SHIPPED | OUTPATIENT
Start: 2017-08-16 | End: 2017-08-26

## 2017-08-16 NOTE — PAYOR COMM NOTE
PER Lorin Leventhal #[id number] 1 DAY- requesting additional day    The patient is a 80-year-old female who was admitted postoperatively status post bilateral mastectomies with immediate tissue expander reconstruction for her known BRCA mutation and hig

## 2017-08-16 NOTE — PROGRESS NOTES
This is a 52year old female that is 16 days s/p her bilateral tissues expander placement (currently filled with air, 600cc BL ). She still has her drains as the output is still over 20cc per day. She denies fevers, signs of infection.  She has left her pinky

## 2017-08-16 NOTE — PROGRESS NOTES
An order for Keflex #40 sent electronically to the patient's preferred pharmacy per Dr. Katiana Abad. Dosing instructions reviewed with the patient.

## 2017-08-17 ENCOUNTER — TELEPHONE (OUTPATIENT)
Dept: SURGERY | Facility: CLINIC | Age: 47
End: 2017-08-17

## 2017-08-17 NOTE — TELEPHONE ENCOUNTER
Pt phoned in with question regarding output from right TANNA drain, the color has gotten a little more red today. She was in yesterday, and had expanders increased, and some manipulation around the drains. Denies that it is bloody, just reddened fluid.  Will

## 2017-08-22 ENCOUNTER — TELEPHONE (OUTPATIENT)
Dept: SURGERY | Facility: CLINIC | Age: 47
End: 2017-08-22

## 2017-08-22 NOTE — TELEPHONE ENCOUNTER
The patient called again today to discuss drain progress and when she should present to the office for removal. I advised that the drains should ideally be less than 20 cc each day for two consecutive days.  She expressed understanding and will call again t

## 2017-08-25 ENCOUNTER — OFFICE VISIT (OUTPATIENT)
Dept: SURGERY | Facility: CLINIC | Age: 47
End: 2017-08-25

## 2017-08-25 VITALS
HEART RATE: 83 BPM | TEMPERATURE: 98 F | WEIGHT: 284.19 LBS | BODY MASS INDEX: 39.79 KG/M2 | SYSTOLIC BLOOD PRESSURE: 142 MMHG | HEIGHT: 71 IN | RESPIRATION RATE: 16 BRPM | DIASTOLIC BLOOD PRESSURE: 91 MMHG

## 2017-08-25 DIAGNOSIS — Z90.13 ABSENCE OF BOTH BREASTS: Primary | ICD-10-CM

## 2017-08-25 PROCEDURE — 99024 POSTOP FOLLOW-UP VISIT: CPT | Performed by: PHYSICIAN ASSISTANT

## 2017-08-25 NOTE — PROGRESS NOTES
This is a 52year old female that is 3.5 weeks s/p her bilateral tissue expander placement. She was originally filled with 600cc of air bilaterally. Additionally, 10cc of air was filled on the left and 60cc on the right at her last visit.  She is here today All of her questions were answered. The plan would be to do an air to saline exchange after she is fully healed regarding the left T junction. Likely in 2-3 weeks from now with Dr. Lupillo Cordoba.  We will hold on any further air fills unless she is completely defl

## 2017-09-08 ENCOUNTER — OFFICE VISIT (OUTPATIENT)
Dept: SURGERY | Facility: CLINIC | Age: 47
End: 2017-09-08

## 2017-09-08 VITALS
RESPIRATION RATE: 16 BRPM | WEIGHT: 161.25 LBS | BODY MASS INDEX: 22 KG/M2 | DIASTOLIC BLOOD PRESSURE: 89 MMHG | HEART RATE: 83 BPM | TEMPERATURE: 98 F | SYSTOLIC BLOOD PRESSURE: 131 MMHG

## 2017-09-08 DIAGNOSIS — Z90.13 ABSENCE OF BOTH BREASTS: Primary | ICD-10-CM

## 2017-09-08 PROCEDURE — 99024 POSTOP FOLLOW-UP VISIT: CPT | Performed by: PHYSICIAN ASSISTANT

## 2017-09-08 NOTE — PROGRESS NOTES
This is a 52year old female that is 5.5 weeks s/p her bilateral tissue expander placement. She was originally filled with 600cc of air bilaterally. Additionally, 100cc of air was filled on the left and 60cc on the right at a previous visit.  She is here to

## 2017-09-18 ENCOUNTER — OFFICE VISIT (OUTPATIENT)
Dept: SURGERY | Facility: CLINIC | Age: 47
End: 2017-09-18

## 2017-09-18 VITALS
WEIGHT: 283.81 LBS | TEMPERATURE: 89 F | HEART RATE: 89 BPM | BODY MASS INDEX: 39.73 KG/M2 | DIASTOLIC BLOOD PRESSURE: 90 MMHG | SYSTOLIC BLOOD PRESSURE: 136 MMHG | HEIGHT: 71 IN

## 2017-09-18 DIAGNOSIS — Z90.13 ABSENCE OF BOTH BREASTS: Primary | ICD-10-CM

## 2017-09-18 PROCEDURE — 99024 POSTOP FOLLOW-UP VISIT: CPT | Performed by: SURGERY

## 2017-09-18 NOTE — PROGRESS NOTES
Hari Lua is a 52year old female who presents today for a follow-up. She denies fever and chills. She denies nausea, vomiting, diarrhea or constipation.        Physical Examination:   09/18/17  1215   BP: 136/90   Pulse: 89   Temp: (!) 89 °F (

## 2017-10-13 ENCOUNTER — OFFICE VISIT (OUTPATIENT)
Dept: SURGERY | Facility: CLINIC | Age: 47
End: 2017-10-13

## 2017-10-13 VITALS
BODY MASS INDEX: 40.32 KG/M2 | WEIGHT: 288 LBS | OXYGEN SATURATION: 99 % | HEART RATE: 78 BPM | SYSTOLIC BLOOD PRESSURE: 139 MMHG | TEMPERATURE: 98 F | DIASTOLIC BLOOD PRESSURE: 86 MMHG | HEIGHT: 71 IN | RESPIRATION RATE: 18 BRPM

## 2017-10-13 DIAGNOSIS — Z90.13 ABSENCE OF BOTH BREASTS: Primary | ICD-10-CM

## 2017-10-13 PROCEDURE — 99024 POSTOP FOLLOW-UP VISIT: CPT | Performed by: PHYSICIAN ASSISTANT

## 2017-10-13 NOTE — PROGRESS NOTES
This is a 52year old female that is 6 weeks s/p her bilateral tissue expander placement. She was originally filled with 600cc of air bilaterally.  At her last visit she had her air to saline exchange and is currently filled with 480cc of saline bilaterall

## 2017-10-27 ENCOUNTER — OFFICE VISIT (OUTPATIENT)
Dept: SURGERY | Facility: CLINIC | Age: 47
End: 2017-10-27

## 2017-10-27 VITALS
BODY MASS INDEX: 39.06 KG/M2 | HEART RATE: 65 BPM | TEMPERATURE: 98 F | RESPIRATION RATE: 17 BRPM | DIASTOLIC BLOOD PRESSURE: 82 MMHG | SYSTOLIC BLOOD PRESSURE: 125 MMHG | HEIGHT: 71 IN | WEIGHT: 279 LBS | OXYGEN SATURATION: 96 %

## 2017-10-27 DIAGNOSIS — Z90.13 ABSENCE OF BOTH BREASTS: Primary | ICD-10-CM

## 2017-10-27 PROCEDURE — 99024 POSTOP FOLLOW-UP VISIT: CPT | Performed by: PHYSICIAN ASSISTANT

## 2017-10-27 NOTE — PROGRESS NOTES
This is a 52year old female that is 13 weeks s/p her bilateral tissue expander placement. She was originally filled with 600cc of air bilaterally. At a previous visit she had her air to saline exchange and was filled with 480cc of saline bilaterally.  Willy

## 2017-11-22 ENCOUNTER — OFFICE VISIT (OUTPATIENT)
Dept: SURGERY | Facility: CLINIC | Age: 47
End: 2017-11-22

## 2017-11-22 ENCOUNTER — TELEPHONE (OUTPATIENT)
Dept: SURGERY | Facility: CLINIC | Age: 47
End: 2017-11-22

## 2017-11-22 DIAGNOSIS — Z90.13 ABSENCE OF BOTH BREASTS: Primary | ICD-10-CM

## 2017-11-22 PROCEDURE — 99213 OFFICE O/P EST LOW 20 MIN: CPT | Performed by: SURGERY

## 2017-11-22 NOTE — CONSULTS
Estabilshed Patient Consultation    Chief Complaint: absence of both breast TE placement    History of Present Illness:   Pavan Thomas is a 52year old female who returns to the office after BL mastectomy and TE placement (600cc).  She has lost over date: 10/11/2012   Smokeless tobacco: Former User         Drug use: No           Review of Systems:    The patient reports no new issues  All other systems are unremarkable. Physical Exam:    There were no vitals taken for this visit.     Constitutiona to bleeding, infection, scarring, seroma, delayed wound healing, partial/total flap loss, fat necrosis, asymmetry, injury to adjacent structures, abdominal hernia/weakness/bulge, need for further surgery, and venous thromboembolism  reviewed.  The expected

## 2017-11-22 NOTE — TELEPHONE ENCOUNTER
Returning pt call in regards to scheduling of her next stage reconstruction. Pt would like to plan for delayed abdominal flap at the next available time.  Pt is aware that there is no open times currently for December or January and we are currently booking

## 2017-11-27 ENCOUNTER — TELEPHONE (OUTPATIENT)
Dept: SURGERY | Facility: CLINIC | Age: 47
End: 2017-11-27

## 2017-11-27 NOTE — TELEPHONE ENCOUNTER
Pt returning call to discuss dates for delayed abdominal flap reconstruction. Pt spent the weekend looking at her personal calendar and decided that she would like to move forward with a date in March 2018.  Hold was placed on surgeon calendars for 3/12/18,

## 2018-01-24 ENCOUNTER — OFFICE VISIT (OUTPATIENT)
Dept: SURGERY | Facility: CLINIC | Age: 48
End: 2018-01-24

## 2018-01-24 VITALS
HEIGHT: 71 IN | SYSTOLIC BLOOD PRESSURE: 155 MMHG | OXYGEN SATURATION: 97 % | HEART RATE: 66 BPM | BODY MASS INDEX: 39.53 KG/M2 | DIASTOLIC BLOOD PRESSURE: 84 MMHG | WEIGHT: 282.38 LBS | TEMPERATURE: 99 F

## 2018-01-24 DIAGNOSIS — Z90.13 ABSENCE OF BOTH BREASTS: Primary | ICD-10-CM

## 2018-01-24 DIAGNOSIS — Z01.818 PREOP EXAMINATION: ICD-10-CM

## 2018-01-24 PROCEDURE — 99213 OFFICE O/P EST LOW 20 MIN: CPT | Performed by: SURGERY

## 2018-01-24 NOTE — CONSULTS
Estabilshed Patient Consultation    Chief Complaint: absence of both breasts    History of Present Illness:   Maritza Marie is a 52year old female who returns to the office after BL mastectomy and TE placement (600cc).  She has lost over 30 lb and i 0.50 Packs/day For 20.00 Years   Quit date: 10/11/2012   Smokeless tobacco: Former User         Drug use: No           Review of Systems:    The patient reports see HPI  All other systems are unremarkable.       Physical Exam:    /84 (BP Location: Lef flap reconstruction including EMPERATRIZ flap, SIEA flap, muscle-sparing free TRAM flap, and free TRAM flap. Representative illustrations and photographs were demonstrated to the patient.  The risks of surgery including but not limited to bleeding, infection, sca

## 2018-01-24 NOTE — PROGRESS NOTES
PRE-OP      Informed consent done today for:  Delayed bilateral breast reconstruction with EMPERATRIZ free flaps     PCP to do surgical clearance - Papo Julio MD  CBC, CMP, EKG, pre-albumin, and transferrin ordered today by me - patient understands timelines

## 2018-02-06 ENCOUNTER — TELEPHONE (OUTPATIENT)
Dept: FAMILY MEDICINE CLINIC | Facility: CLINIC | Age: 48
End: 2018-02-06

## 2018-02-06 NOTE — TELEPHONE ENCOUNTER
FYI:     PT COMING IN ON 2/23 FOR PRE-OP. PT WAS ADV THAT PRE-OP ORDERS ENTERED. JUST WANTED TO MAKE SURE DR PETERS CAN SEE ORDERS.     ADV PT THAT WE WERE ABLE TO SEE ORDERS    THANK YOU

## 2018-02-12 ENCOUNTER — TELEPHONE (OUTPATIENT)
Dept: SURGERY | Facility: CLINIC | Age: 48
End: 2018-02-12

## 2018-02-12 NOTE — TELEPHONE ENCOUNTER
I called the patient on behalf of Dr. Issac Welch to review the pre-surgical plan of care.  The patient has yet to obtain lab testing ordered on 1/24 (transferrin, prealbumin, CBC and CMP) by our PA-  We discussed that these results are important for surgical dany

## 2018-02-13 ENCOUNTER — LAB ENCOUNTER (OUTPATIENT)
Dept: LAB | Age: 48
End: 2018-02-13
Attending: PHYSICIAN ASSISTANT
Payer: COMMERCIAL

## 2018-02-13 DIAGNOSIS — Z01.818 PREOP EXAMINATION: ICD-10-CM

## 2018-02-13 PROCEDURE — 85025 COMPLETE CBC W/AUTO DIFF WBC: CPT

## 2018-02-13 PROCEDURE — 80053 COMPREHEN METABOLIC PANEL: CPT

## 2018-02-13 PROCEDURE — 36415 COLL VENOUS BLD VENIPUNCTURE: CPT

## 2018-02-13 PROCEDURE — 84134 ASSAY OF PREALBUMIN: CPT

## 2018-02-13 PROCEDURE — 84466 ASSAY OF TRANSFERRIN: CPT

## 2018-02-14 LAB
ALBUMIN SERPL-MCNC: 4.3 G/DL (ref 3.5–4.8)
ALP LIVER SERPL-CCNC: 90 U/L (ref 39–100)
ALT SERPL-CCNC: 16 U/L (ref 14–54)
AST SERPL-CCNC: 13 U/L (ref 15–41)
BASOPHILS # BLD AUTO: 0.06 X10(3) UL (ref 0–0.1)
BASOPHILS NFR BLD AUTO: 1.1 %
BILIRUB SERPL-MCNC: 0.5 MG/DL (ref 0.1–2)
BUN BLD-MCNC: 16 MG/DL (ref 8–20)
CALCIUM BLD-MCNC: 9.4 MG/DL (ref 8.3–10.3)
CHLORIDE: 103 MMOL/L (ref 101–111)
CO2: 30 MMOL/L (ref 22–32)
CREAT BLD-MCNC: 0.79 MG/DL (ref 0.55–1.02)
EOSINOPHIL # BLD AUTO: 0.22 X10(3) UL (ref 0–0.3)
EOSINOPHIL NFR BLD AUTO: 3.9 %
ERYTHROCYTE [DISTWIDTH] IN BLOOD BY AUTOMATED COUNT: 13.8 % (ref 11.5–16)
GLUCOSE BLD-MCNC: 84 MG/DL (ref 70–99)
HCT VFR BLD AUTO: 44 % (ref 34–50)
HGB BLD-MCNC: 14.3 G/DL (ref 12–16)
IMMATURE GRANULOCYTE COUNT: 0.01 X10(3) UL (ref 0–1)
IMMATURE GRANULOCYTE RATIO %: 0.2 %
LYMPHOCYTES # BLD AUTO: 2.71 X10(3) UL (ref 0.9–4)
LYMPHOCYTES NFR BLD AUTO: 47.5 %
M PROTEIN MFR SERPL ELPH: 7.5 G/DL (ref 6.1–8.3)
MCH RBC QN AUTO: 28.2 PG (ref 27–33.2)
MCHC RBC AUTO-ENTMCNC: 32.5 G/DL (ref 31–37)
MCV RBC AUTO: 86.8 FL (ref 81–100)
MONOCYTES # BLD AUTO: 0.39 X10(3) UL (ref 0.1–1)
MONOCYTES NFR BLD AUTO: 6.8 %
NEUTROPHIL ABS PRELIM: 2.31 X10 (3) UL (ref 1.3–6.7)
NEUTROPHILS # BLD AUTO: 2.31 X10(3) UL (ref 1.3–6.7)
NEUTROPHILS NFR BLD AUTO: 40.5 %
PLATELET # BLD AUTO: 252 10(3)UL (ref 150–450)
POTASSIUM SERPL-SCNC: 4 MMOL/L (ref 3.6–5.1)
PREALBUMIN: 40.8 MG/DL (ref 20–40)
RBC # BLD AUTO: 5.07 X10(6)UL (ref 3.8–5.1)
RED CELL DISTRIBUTION WIDTH-SD: 44 FL (ref 35.1–46.3)
SODIUM SERPL-SCNC: 138 MMOL/L (ref 136–144)
TRANSFERRIN: 282 MG/DL (ref 200–360)
WBC # BLD AUTO: 5.7 X10(3) UL (ref 4–13)

## 2018-02-19 ENCOUNTER — TELEPHONE (OUTPATIENT)
Dept: SURGERY | Facility: CLINIC | Age: 48
End: 2018-02-19

## 2018-02-19 NOTE — TELEPHONE ENCOUNTER
I called and spoke with the patient regarding her nutritional lab value results per Dr. Oneida France. We discussed that her labs are within normal limits.  However, Dr. Oneida France does strongly recommend that the patient attempt more weight loss to reach a BMI of 35 pr

## 2018-02-23 ENCOUNTER — OFFICE VISIT (OUTPATIENT)
Dept: FAMILY MEDICINE CLINIC | Facility: CLINIC | Age: 48
End: 2018-02-23

## 2018-02-23 ENCOUNTER — TELEPHONE (OUTPATIENT)
Dept: SURGERY | Facility: CLINIC | Age: 48
End: 2018-02-23

## 2018-02-23 VITALS
DIASTOLIC BLOOD PRESSURE: 84 MMHG | BODY MASS INDEX: 38.36 KG/M2 | RESPIRATION RATE: 16 BRPM | WEIGHT: 274 LBS | SYSTOLIC BLOOD PRESSURE: 120 MMHG | TEMPERATURE: 98 F | HEIGHT: 71 IN | HEART RATE: 68 BPM

## 2018-02-23 DIAGNOSIS — Z01.818 PREOP EXAMINATION: Primary | ICD-10-CM

## 2018-02-23 DIAGNOSIS — E66.9 OBESITY (BMI 35.0-39.9 WITHOUT COMORBIDITY): ICD-10-CM

## 2018-02-23 DIAGNOSIS — Z90.13 ABSENCE OF BOTH BREASTS: ICD-10-CM

## 2018-02-23 PROCEDURE — 93000 ELECTROCARDIOGRAM COMPLETE: CPT | Performed by: FAMILY MEDICINE

## 2018-02-23 PROCEDURE — 99244 OFF/OP CNSLTJ NEW/EST MOD 40: CPT | Performed by: FAMILY MEDICINE

## 2018-02-23 NOTE — TELEPHONE ENCOUNTER
I called the patient to discuss a new weight loss medication, Adipex, that she is considering to start to help with weight loss. I explained that there is no contraindication to take this medication prior to surgery from a plastic surgery perspective.  She

## 2018-02-23 NOTE — PROGRESS NOTES
Mirna Gonzalez is a 50year old female.     CC:  Patient presents with:  Pre-Op Exam: for breast reconstruction per pt      HPI:  I am seeing Mirna Gonzalez for preoperative evaluation at the request of Dr. Yumiko Dangelo MD for my evaluation of Breast Cancer Mother      DCIS   • Other [OTHER] Mother      Factor V leiden   • Breast Cancer Sister      BRCA 2 positive, invasive ductal carcinoma    • Heart Disorder Father    • Breast Cancer Other      Aunt        Relation Status Comments   Mo Alive Count      150.0 - 450.0 10(3)uL 252.0   MCV      81.0 - 100.0 fL 86.8   MCH      27.0 - 33.2 pg 28.2   MCHC      31.0 - 37.0 g/dL 32.5   RDW      11.5 - 16.0 % 13.8   RDW-SD      35.1 - 46.3 fL 44.0   Prelim Neutrophil Abs      1.30 - 6.70 x10 (3) uL 2.31 procedure. She will contact her surgeon to ensure no issue with taking this prior to surgery, if not then rx will be sent to pharmacy of choice. Orders for this visit:  No orders of the defined types were placed in this encounter.       Emmanuel Jacobs

## 2018-03-11 ENCOUNTER — ANESTHESIA EVENT (OUTPATIENT)
Dept: SURGERY | Facility: HOSPITAL | Age: 48
End: 2018-03-11

## 2018-03-11 NOTE — ANESTHESIA PREPROCEDURE EVALUATION
PRE-OP EVALUATION    Patient Name: Mini Robles    Pre-op Diagnosis: Absence of both breasts [Z90.13]  BRCA2 gene mutation positive [Z15.01, Z15.09]    Procedure(s):  Delayed bilateral breasts deep inferior epigastric  free flap reconstru Exploratory Lap  No date: TONSILLECTOMY     Smoking status: Former Smoker  0.50 Packs/day  For 20.00 Years     Quit date: 10/11/2012    Smokeless tobacco: Former User    Alcohol use No       Drug use: No     Available pre-op labs reviewed.     Lab Results

## 2018-03-12 ENCOUNTER — HOSPITAL ENCOUNTER (INPATIENT)
Facility: HOSPITAL | Age: 48
LOS: 5 days | Discharge: HOME | DRG: 584 | End: 2018-03-17
Attending: SURGERY | Admitting: SURGERY
Payer: COMMERCIAL

## 2018-03-12 ENCOUNTER — ANESTHESIA (OUTPATIENT)
Dept: SURGERY | Facility: HOSPITAL | Age: 48
End: 2018-03-12

## 2018-03-12 ENCOUNTER — SURGERY (OUTPATIENT)
Age: 48
End: 2018-03-12

## 2018-03-12 DIAGNOSIS — Z15.01 BRCA2 GENE MUTATION POSITIVE: ICD-10-CM

## 2018-03-12 DIAGNOSIS — Z90.13 ABSENCE OF BOTH BREASTS: ICD-10-CM

## 2018-03-12 DIAGNOSIS — Z15.09 BRCA2 GENE MUTATION POSITIVE: ICD-10-CM

## 2018-03-12 LAB
ALBUMIN SERPL-MCNC: 3.2 G/DL (ref 3.5–4.8)
ALP LIVER SERPL-CCNC: 72 U/L (ref 39–100)
ALT SERPL-CCNC: 16 U/L (ref 14–54)
AST SERPL-CCNC: 31 U/L (ref 15–41)
BILIRUB SERPL-MCNC: 0.4 MG/DL (ref 0.1–2)
BUN BLD-MCNC: 15 MG/DL (ref 8–20)
CALCIUM BLD-MCNC: 8.6 MG/DL (ref 8.3–10.3)
CHLORIDE: 109 MMOL/L (ref 101–111)
CO2: 23 MMOL/L (ref 22–32)
CREAT BLD-MCNC: 1.11 MG/DL (ref 0.55–1.02)
GLUCOSE BLD-MCNC: 142 MG/DL (ref 70–99)
M PROTEIN MFR SERPL ELPH: 6 G/DL (ref 6.1–8.3)
POTASSIUM SERPL-SCNC: 4.4 MMOL/L (ref 3.6–5.1)
SODIUM SERPL-SCNC: 143 MMOL/L (ref 136–144)

## 2018-03-12 PROCEDURE — 0HPT0NZ REMOVAL OF TISSUE EXPANDER FROM RIGHT BREAST, OPEN APPROACH: ICD-10-PCS | Performed by: SURGERY

## 2018-03-12 PROCEDURE — 0HPU0NZ REMOVAL OF TISSUE EXPANDER FROM LEFT BREAST, OPEN APPROACH: ICD-10-PCS | Performed by: SURGERY

## 2018-03-12 PROCEDURE — 0HRV077 REPLACEMENT OF BILATERAL BREAST USING DEEP INFERIOR EPIGASTRIC ARTERY PERFORATOR FLAP, OPEN APPROACH: ICD-10-PCS | Performed by: SURGERY

## 2018-03-12 DEVICE — 3.0MM GOLD COUPLER: Type: IMPLANTABLE DEVICE | Site: BREAST | Status: FUNCTIONAL

## 2018-03-12 DEVICE — 2.5MM RED COUPLER: Type: IMPLANTABLE DEVICE | Site: BREAST | Status: FUNCTIONAL

## 2018-03-12 RX ORDER — SODIUM CHLORIDE, SODIUM LACTATE, POTASSIUM CHLORIDE, CALCIUM CHLORIDE 600; 310; 30; 20 MG/100ML; MG/100ML; MG/100ML; MG/100ML
INJECTION, SOLUTION INTRAVENOUS CONTINUOUS
Status: DISCONTINUED | OUTPATIENT
Start: 2018-03-12 | End: 2018-03-12

## 2018-03-12 RX ORDER — ONDANSETRON 2 MG/ML
4 INJECTION INTRAMUSCULAR; INTRAVENOUS AS NEEDED
Status: DISPENSED | OUTPATIENT
Start: 2018-03-12 | End: 2018-03-13

## 2018-03-12 RX ORDER — OXYCODONE HYDROCHLORIDE 5 MG/1
5 TABLET ORAL EVERY 4 HOURS PRN
Status: DISCONTINUED | OUTPATIENT
Start: 2018-03-12 | End: 2018-03-14

## 2018-03-12 RX ORDER — TIZANIDINE 4 MG/1
4 TABLET ORAL 3 TIMES DAILY PRN
Status: DISCONTINUED | OUTPATIENT
Start: 2018-03-12 | End: 2018-03-17

## 2018-03-12 RX ORDER — CEFAZOLIN SODIUM/WATER 2 G/20 ML
2 SYRINGE (ML) INTRAVENOUS EVERY 8 HOURS
Status: DISCONTINUED | OUTPATIENT
Start: 2018-03-12 | End: 2018-03-12 | Stop reason: SDUPTHER

## 2018-03-12 RX ORDER — ONDANSETRON 2 MG/ML
4 INJECTION INTRAMUSCULAR; INTRAVENOUS EVERY 6 HOURS PRN
Status: DISCONTINUED | OUTPATIENT
Start: 2018-03-12 | End: 2018-03-16

## 2018-03-12 RX ORDER — CEFAZOLIN SODIUM 1 G/3ML
INJECTION, POWDER, FOR SOLUTION INTRAMUSCULAR; INTRAVENOUS
Status: DISCONTINUED | OUTPATIENT
Start: 2018-03-12 | End: 2018-03-12 | Stop reason: HOSPADM

## 2018-03-12 RX ORDER — HYDROCODONE BITARTRATE AND ACETAMINOPHEN 5; 325 MG/1; MG/1
2 TABLET ORAL AS NEEDED
Status: COMPLETED | OUTPATIENT
Start: 2018-03-12 | End: 2018-03-13

## 2018-03-12 RX ORDER — HYDROMORPHONE HYDROCHLORIDE 1 MG/ML
0.8 INJECTION, SOLUTION INTRAMUSCULAR; INTRAVENOUS; SUBCUTANEOUS EVERY 2 HOUR PRN
Status: DISPENSED | OUTPATIENT
Start: 2018-03-12 | End: 2018-03-14

## 2018-03-12 RX ORDER — BUPIVACAINE HYDROCHLORIDE AND EPINEPHRINE 5; 5 MG/ML; UG/ML
INJECTION, SOLUTION EPIDURAL; INTRACAUDAL; PERINEURAL AS NEEDED
Status: DISCONTINUED | OUTPATIENT
Start: 2018-03-12 | End: 2018-03-12 | Stop reason: HOSPADM

## 2018-03-12 RX ORDER — MORPHINE SULFATE 4 MG/ML
0.5 INJECTION, SOLUTION INTRAMUSCULAR; INTRAVENOUS
Status: DISCONTINUED | OUTPATIENT
Start: 2018-03-12 | End: 2018-03-14

## 2018-03-12 RX ORDER — HYDROMORPHONE HYDROCHLORIDE 1 MG/ML
0.4 INJECTION, SOLUTION INTRAMUSCULAR; INTRAVENOUS; SUBCUTANEOUS EVERY 2 HOUR PRN
Status: DISPENSED | OUTPATIENT
Start: 2018-03-12 | End: 2018-03-14

## 2018-03-12 RX ORDER — MORPHINE SULFATE 2 MG/ML
2 INJECTION, SOLUTION INTRAMUSCULAR; INTRAVENOUS EVERY 5 MIN PRN
Status: ACTIVE | OUTPATIENT
Start: 2018-03-12 | End: 2018-03-12

## 2018-03-12 RX ORDER — DIPHENHYDRAMINE HYDROCHLORIDE 50 MG/ML
25 INJECTION INTRAMUSCULAR; INTRAVENOUS EVERY 4 HOURS PRN
Status: DISCONTINUED | OUTPATIENT
Start: 2018-03-12 | End: 2018-03-17

## 2018-03-12 RX ORDER — METOCLOPRAMIDE HYDROCHLORIDE 5 MG/ML
10 INJECTION INTRAMUSCULAR; INTRAVENOUS EVERY 6 HOURS PRN
Status: DISCONTINUED | OUTPATIENT
Start: 2018-03-12 | End: 2018-03-17

## 2018-03-12 RX ORDER — ASPIRIN 81 MG/1
81 TABLET, CHEWABLE ORAL DAILY
Status: DISCONTINUED | OUTPATIENT
Start: 2018-03-13 | End: 2018-03-17

## 2018-03-12 RX ORDER — HYDROCODONE BITARTRATE AND ACETAMINOPHEN 5; 325 MG/1; MG/1
1 TABLET ORAL AS NEEDED
Status: COMPLETED | OUTPATIENT
Start: 2018-03-12 | End: 2018-03-13

## 2018-03-12 RX ORDER — MEPERIDINE HYDROCHLORIDE 25 MG/ML
12.5 INJECTION INTRAMUSCULAR; INTRAVENOUS; SUBCUTANEOUS AS NEEDED
Status: DISCONTINUED | OUTPATIENT
Start: 2018-03-12 | End: 2018-03-14

## 2018-03-12 RX ORDER — ONDANSETRON 2 MG/ML
4 INJECTION INTRAMUSCULAR; INTRAVENOUS AS NEEDED
Status: ACTIVE | OUTPATIENT
Start: 2018-03-12 | End: 2018-03-12

## 2018-03-12 RX ORDER — MIDAZOLAM HYDROCHLORIDE 1 MG/ML
1 INJECTION INTRAMUSCULAR; INTRAVENOUS EVERY 5 MIN PRN
Status: ACTIVE | OUTPATIENT
Start: 2018-03-12 | End: 2018-03-13

## 2018-03-12 RX ORDER — SODIUM CHLORIDE, SODIUM LACTATE, POTASSIUM CHLORIDE, CALCIUM CHLORIDE 600; 310; 30; 20 MG/100ML; MG/100ML; MG/100ML; MG/100ML
INJECTION, SOLUTION INTRAVENOUS CONTINUOUS
Status: DISCONTINUED | OUTPATIENT
Start: 2018-03-12 | End: 2018-03-17

## 2018-03-12 RX ORDER — HEPARIN SODIUM 5000 [USP'U]/ML
5000 INJECTION, SOLUTION INTRAVENOUS; SUBCUTANEOUS ONCE
Status: DISCONTINUED | OUTPATIENT
Start: 2018-03-12 | End: 2018-03-12 | Stop reason: HOSPADM

## 2018-03-12 RX ORDER — MORPHINE SULFATE 4 MG/ML
2 INJECTION, SOLUTION INTRAMUSCULAR; INTRAVENOUS EVERY 5 MIN PRN
Status: DISPENSED | OUTPATIENT
Start: 2018-03-12 | End: 2018-03-13

## 2018-03-12 RX ORDER — VERAPAMIL HYDROCHLORIDE 2.5 MG/ML
INJECTION, SOLUTION INTRAVENOUS AS NEEDED
Status: DISCONTINUED | OUTPATIENT
Start: 2018-03-12 | End: 2018-03-12 | Stop reason: HOSPADM

## 2018-03-12 RX ORDER — CEFAZOLIN SODIUM/WATER 2 G/20 ML
2 SYRINGE (ML) INTRAVENOUS EVERY 8 HOURS
Status: COMPLETED | OUTPATIENT
Start: 2018-03-13 | End: 2018-03-13

## 2018-03-12 RX ORDER — NALOXONE HYDROCHLORIDE 0.4 MG/ML
80 INJECTION, SOLUTION INTRAMUSCULAR; INTRAVENOUS; SUBCUTANEOUS AS NEEDED
Status: ACTIVE | OUTPATIENT
Start: 2018-03-12 | End: 2018-03-13

## 2018-03-12 RX ORDER — DIPHENHYDRAMINE HYDROCHLORIDE 50 MG/ML
12.5 INJECTION INTRAMUSCULAR; INTRAVENOUS AS NEEDED
Status: ACTIVE | OUTPATIENT
Start: 2018-03-12 | End: 2018-03-13

## 2018-03-12 RX ORDER — OXYCODONE HYDROCHLORIDE 10 MG/1
10 TABLET ORAL EVERY 4 HOURS PRN
Status: DISCONTINUED | OUTPATIENT
Start: 2018-03-12 | End: 2018-03-14

## 2018-03-12 RX ORDER — DOCUSATE SODIUM 100 MG/1
100 CAPSULE, LIQUID FILLED ORAL 2 TIMES DAILY
Status: DISCONTINUED | OUTPATIENT
Start: 2018-03-13 | End: 2018-03-17

## 2018-03-12 RX ORDER — NALOXONE HYDROCHLORIDE 0.4 MG/ML
80 INJECTION, SOLUTION INTRAMUSCULAR; INTRAVENOUS; SUBCUTANEOUS AS NEEDED
Status: ACTIVE | OUTPATIENT
Start: 2018-03-12 | End: 2018-03-12

## 2018-03-12 RX ORDER — ACETAMINOPHEN 325 MG/1
650 TABLET ORAL 4 TIMES DAILY
Status: DISCONTINUED | OUTPATIENT
Start: 2018-03-12 | End: 2018-03-14

## 2018-03-12 RX ORDER — ENOXAPARIN SODIUM 100 MG/ML
40 INJECTION SUBCUTANEOUS DAILY
Status: DISCONTINUED | OUTPATIENT
Start: 2018-03-13 | End: 2018-03-17

## 2018-03-12 RX ORDER — ASPIRIN 300 MG
SUPPOSITORY, RECTAL RECTAL AS NEEDED
Status: DISCONTINUED | OUTPATIENT
Start: 2018-03-12 | End: 2018-03-12 | Stop reason: HOSPADM

## 2018-03-12 RX ORDER — HYDROMORPHONE HYDROCHLORIDE 1 MG/ML
1.2 INJECTION, SOLUTION INTRAMUSCULAR; INTRAVENOUS; SUBCUTANEOUS EVERY 2 HOUR PRN
Status: DISPENSED | OUTPATIENT
Start: 2018-03-12 | End: 2018-03-14

## 2018-03-12 RX ORDER — HEPARIN SODIUM 5000 [USP'U]/ML
INJECTION, SOLUTION INTRAVENOUS; SUBCUTANEOUS
Status: DISPENSED
Start: 2018-03-12 | End: 2018-03-12

## 2018-03-12 RX ORDER — HEPARIN SODIUM 5000 [USP'U]/ML
INJECTION, SOLUTION INTRAVENOUS; SUBCUTANEOUS AS NEEDED
Status: DISCONTINUED | OUTPATIENT
Start: 2018-03-12 | End: 2018-03-12 | Stop reason: HOSPADM

## 2018-03-12 NOTE — ANESTHESIA POSTPROCEDURE EVALUATION
[id number] [facility] Patient Status:  Surgery Admit   Age/Gender 50year old female MRN [id number]   [facility] SURGERY Attending Genaro Bobo MD   Hosp Day # 0 PCP Mattie Kent MD       Anesthesia Post-op Note    Proce

## 2018-03-13 ENCOUNTER — APPOINTMENT (OUTPATIENT)
Dept: GENERAL RADIOLOGY | Facility: HOSPITAL | Age: 48
DRG: 584 | End: 2018-03-13
Attending: SURGERY
Payer: COMMERCIAL

## 2018-03-13 LAB
APTT PPP: 25.9 SECONDS (ref 25–34)
BASOPHILS # BLD AUTO: 0.02 X10(3) UL (ref 0–0.1)
BASOPHILS NFR BLD AUTO: 0.2 %
BILIRUB UR QL STRIP.AUTO: NEGATIVE
BUN BLD-MCNC: 16 MG/DL (ref 8–20)
CALCIUM BLD-MCNC: 8.4 MG/DL (ref 8.3–10.3)
CHLORIDE: 109 MMOL/L (ref 101–111)
CLARITY UR REFRACT.AUTO: CLEAR
CO2: 27 MMOL/L (ref 22–32)
COLOR UR AUTO: YELLOW
CREAT BLD-MCNC: 0.88 MG/DL (ref 0.55–1.02)
EOSINOPHIL # BLD AUTO: 0 X10(3) UL (ref 0–0.3)
EOSINOPHIL NFR BLD AUTO: 0 %
ERYTHROCYTE [DISTWIDTH] IN BLOOD BY AUTOMATED COUNT: 14.3 % (ref 11.5–16)
GLUCOSE BLD-MCNC: 133 MG/DL (ref 70–99)
GLUCOSE UR STRIP.AUTO-MCNC: NEGATIVE MG/DL
HAV IGM SER QL: 1.9 MG/DL (ref 1.7–3)
HCT VFR BLD AUTO: 41.5 % (ref 34–50)
HGB BLD-MCNC: 13.5 G/DL (ref 12–16)
HYALINE CASTS #/AREA URNS AUTO: PRESENT /LPF
IMMATURE GRANULOCYTE COUNT: 0.04 X10(3) UL (ref 0–1)
IMMATURE GRANULOCYTE RATIO %: 0.3 %
INR BLD: 1.13 (ref 0.89–1.11)
KETONES UR STRIP.AUTO-MCNC: NEGATIVE MG/DL
LEUKOCYTE ESTERASE UR QL STRIP.AUTO: NEGATIVE
LYMPHOCYTES # BLD AUTO: 1.89 X10(3) UL (ref 0.9–4)
LYMPHOCYTES NFR BLD AUTO: 15.2 %
MCH RBC QN AUTO: 28.4 PG (ref 27–33.2)
MCHC RBC AUTO-ENTMCNC: 32.5 G/DL (ref 31–37)
MCV RBC AUTO: 87.4 FL (ref 81–100)
MONOCYTES # BLD AUTO: 1.19 X10(3) UL (ref 0.1–1)
MONOCYTES NFR BLD AUTO: 9.5 %
NEUTROPHIL ABS PRELIM: 9.33 X10 (3) UL (ref 1.3–6.7)
NEUTROPHILS # BLD AUTO: 9.33 X10(3) UL (ref 1.3–6.7)
NEUTROPHILS NFR BLD AUTO: 74.8 %
NITRITE UR QL STRIP.AUTO: NEGATIVE
PH UR STRIP.AUTO: 5 [PH] (ref 4.5–8)
PHOSPHATE SERPL-MCNC: 3.9 MG/DL (ref 2.5–4.9)
PLATELET # BLD AUTO: 222 10(3)UL (ref 150–450)
POTASSIUM SERPL-SCNC: 4.2 MMOL/L (ref 3.6–5.1)
PROT UR STRIP.AUTO-MCNC: NEGATIVE MG/DL
PSA SERPL DL<=0.01 NG/ML-MCNC: 14.6 SECONDS (ref 12–14.3)
RBC # BLD AUTO: 4.75 X10(6)UL (ref 3.8–5.1)
RED CELL DISTRIBUTION WIDTH-SD: 45.7 FL (ref 35.1–46.3)
SODIUM SERPL-SCNC: 142 MMOL/L (ref 136–144)
SP GR UR STRIP.AUTO: 1.01 (ref 1–1.03)
UROBILINOGEN UR STRIP.AUTO-MCNC: <2 MG/DL
WBC # BLD AUTO: 12.5 X10(3) UL (ref 4–13)

## 2018-03-13 PROCEDURE — 71045 X-RAY EXAM CHEST 1 VIEW: CPT | Performed by: NURSE PRACTITIONER

## 2018-03-13 RX ORDER — FUROSEMIDE 10 MG/ML
20 INJECTION INTRAMUSCULAR; INTRAVENOUS ONCE
Status: COMPLETED | OUTPATIENT
Start: 2018-03-13 | End: 2018-03-13

## 2018-03-13 NOTE — OPERATIVE REPORT
[facility]    PATIENT'S NAME: Kristian SOLO   ATTENDING PHYSICIAN: Tete Diallo MD   OPERATING PHYSICIAN: Dl Sanon M.D.    PATIENT ACCOUNT#:   [de-identified]    LOCATION:  [address] [place] [address]  MEDICAL RECORD #:   [id number]       DATE OF BIRTH Intravenous antibiotic prophylaxis was administered. The patient then underwent successful induction of general anesthesia and endotracheal intubation. The arms were placed abducted on foam padded arm boards and loosely secured with Kerlix.   A Barreto cath surgery requiring an extended period of time to dissect the vessels without injury. A small rent was made in the peritoneum and this was closed with interrupted 2-0 Vicryl sutures.   The dissection proceeded to the origin where there were 2 right deep infe clipped distally, and divided. The flap and recipient vessels were irrigated with heparinized saline irrigation. They were dissected of any redundant adventitial tissue under the operating microscope.   The venous anastomosis was performed with a 3.0 mm c deep dermal suture and running 4-0 Monocryl subcuticular suture. The flap continued to have normal capillary refill and audible cutaneous Doppler signal.  A ViOptix probe was placed, which showed adequate readings.   Biopatch and Tegaderm were placed on th

## 2018-03-13 NOTE — PROGRESS NOTES
PLASTICS    This is a 50year old female that is POD #1 S/P her delayed bilateral breast reconstruction with EMPERATRIZ free flaps. She has been NPO overnight. She has been on bed rest. Dopplers have been done every 1 hour overnight.  She feels her pain is well m prophylaxis with Lovenox  3. ASA 81mg for anti-platelet effect  4. Continue Vioptix monitoring  5. Pain management plan today - continue Dilaudid panel, oral Oxycodone, tylenol, and muscle relaxer as needed.  Goal to transition to Norco and less IV pain med

## 2018-03-13 NOTE — PROGRESS NOTES
PRS    POD # 1 s/p BL EMPERATRIZ free flap  Pt doing well  Flaps warm and viable viotix 76% R 29% L  Umbilicus viable abdominal incisions CDI  Improving numbness of R thumb and index finder dorsal hand  A/P: continue flap monitoring   Q 1 hour Dopplers today, VT

## 2018-03-13 NOTE — PROGRESS NOTES
ICU  Critical Care APRN Progress Note    NAME: Kailey Cleaning - ROOM: [address] [place] [address] - MRN: [id number] - Age: 50year old - :1970    History Of Present Illness:  Kailey Cleaning is a 50year old female with PMHx significant for strong family hi HPI.    OBJECTIVE  Vitals:  /83   Pulse 88   Temp 98.1 °F (36.7 °C) (Oral)   Resp 17   Ht 177.8 cm (5' 10\")   Wt 271 lb (122.9 kg)   SpO2 99%   BMI 37.80 kg/m²   Physical Exam:    General Appearance: Drowsy, cooperative, no distress, appears stated indicated  -NPO    Proph:  -SQ lovenox to start in AM  -Protonix  -SCD    Dispo:   -Full code  -ICU post-op monitoring and management    Plan of care discussed with intensivist on-call, Ismael Dean 3 NP  [facility]

## 2018-03-13 NOTE — ANESTHESIA POSTPROCEDURE EVALUATION
[id number] [facility] Patient Status:  Inpatient   Age/Gender 50year old female MRN [id number]   [facility] 4SW-A Attending Domenico Mir., MD   Hosp Day # 0 PCP Papo Julio MD       Anesthesia Post-op Note    Procedure(s

## 2018-03-13 NOTE — PLAN OF CARE
Pt alert/oriented x4, hemodynamically stable. Tmax 99.1. Incentive spirometry provided and taught to patient. Pain managed with PO and IV prn pain meds as documented. Pt had some sips of clear liquids, tolerated well.   No flatus, hypoactive bowel sound

## 2018-03-13 NOTE — CONSULTS
[facility]  Report of Consultation    Arkathya Montoya Patient Status:  Inpatient    1970 MRN [id number]   [facility] 4SW-A Attending Alonzo Kelly MD   Hosp Day # 1 PCP Kiana Wang MD     Reason for Consultation: Quincy Scott  DELIVERY ONLY  No date: HYSTERECTOMY      Comment: Total abdominal hysterectomy with                salpingectomy  No date: HYSTEROSCOPY  2017: INSERT TISSUE EXPANDER(S) Bilateral      Comment: 133 MX-16-T 850 mL tissue expander   2017 problems, trouble swallowing, speech problems, gait problems and weakness. Behavioral/Psych: Negative for active tobacco use. Endocrine: No history of diabetes, thyroid disorder. All other review of systems are negative.     Vital signs:  /78   Pul 23.0  27.0     No results for input(s): ABGPHT, EPWQTL5C, WRPZD7X, ABGHCO3, ABGBE, TEMP, ELIZABETH, SITE, DEV, THGB in the last 72 hours.     Invalid input(s): BDU16XIP, CHOB      Cultures: No positive cultures    Radiology: Chest x-ray with suboptimal inspira

## 2018-03-13 NOTE — PAYOR COMM NOTE
--------------  ADMISSION REVIEW     Payor: Christie RIOS/HEIDY  Subscriber #:  [id number]  Authorization Number: [id number]    Admit date: 3/12/18  Admit time: 2047       Admitting Physician: Sandy Aaron MD  Attending Physician:  Sandy Aaron MD  Pr discussed that medical risks are increased given her morbid obesity and those would include VTE, heart attack, stroke, organ failure, and death. She understands and wishes to proceed.   Questions were answered.     PROCEDURE:  The patient was identified in the recipient site was prepared by opening the Tommas Dennis pattern skin incision at the vertical and medial part of the incision, and the flaps were elevated from the pectoralis muscle.   The pectoralis muscle was then tacked back down after extensive capsulectom de-epithelialized,  leaving a relative small, approximately 8 x 3 cm, skin pedicle on the vertical limb incision. A 15 round TANNA suction drain was placed.   Then, the flap was inset with a combination of 0 Vicryl sutures for the reduction pattern and 3-0 Vi 32:33:21    ADMISSION DATE:  03/12/2018        OPERATION DATE:  03/12/2018   OPERATIVE REPORT  PREOPERATIVE DIAGNOSIS:  History of BRCA2 genetic mutation status post bilateral mastectomy and tissue expander reconstruction.   POSTOPERATIVE DIAGNOSIS:  Histor catheter was placed in the usual sterile fashion. Heparin 5000 units subcutaneous was administered. Rectal aspirin suppository was given. The chest and abdomen were then prepped and draped sterilely.   Reconstruction of the left breast was performed by NADEEM inferior epigastric veins, 1 large and 1 diminutive, and a single artery. These were circumferentially dissected. The flap was then temporarily stapled in place.   It was noted to have normal capillary refill and an audible cutaneous Doppler signal.  The were released. The anastomoses were noted to be hemostatic and the flap continued to have normal perfusion. The most distal aspect of the flap was excised with electrocautery.   The flaps were placed in the chest wall pocket, taking care to maintain its p procedure was of increased complexity given the patient's preoperative BMI of 39.4 requiring the participation of a third assistant for retraction of the large flap as well as the excess nature of wound closure given the patient's size.   In addition, the p HCl (DILAUDID) 1 MG/ML injection 0.8 mg     Date Action Dose Route User    3/13/2018 0906 Given 0.8 mg Intravenous Luisa Baires RN    3/13/2018 0530 Given 0.8 mg Intravenous Katalina Beard RN      lactated ringers IV bolus 1,000 mL     Date Action

## 2018-03-13 NOTE — OPERATIVE REPORT
[facility]    PATIENT'S NAME: Leonela SOLO   ATTENDING PHYSICIAN: Tete Sandhu MD   OPERATING PHYSICIAN: Tete Sandhu MD   PATIENT ACCOUNT#:   [id number]    LOCATION:  4SW-A [facility]  MEDICAL RECORD #:   [id number]       DATE OF BIRTH:  0 are increased given her morbid obesity and those would include VTE, heart attack, stroke, organ failure, and death. She understands and wishes to proceed. Questions were answered.     PROCEDURE:  The patient was identified in the preoperative area, inform by opening the Gregory Darter pattern skin incision at the vertical and medial part of the incision, and the flaps were elevated from the pectoralis muscle. The pectoralis muscle was then tacked back down after extensive capsulectomy was performed.   The entire cap small, approximately 8 x 3 cm, skin pedicle on the vertical limb incision. A 15 round TANNA suction drain was placed.   Then, the flap was inset with a combination of 0 Vicryl sutures for the reduction pattern and 3-0 Vicryl sutures for the sutures around the 92:07:76  [facility] [id number]  [facility]/

## 2018-03-13 NOTE — PLAN OF CARE
GASTROINTESTINAL - ADULT    • Minimal or absence of nausea and vomiting Progressing    • Maintains or returns to baseline bowel function Progressing          GENITOURINARY - ADULT    • Absence of urinary retention Progressing          PAIN - ADULT    • Mary

## 2018-03-13 NOTE — PROGRESS NOTES
Low UOP continues post LR bolus. Dr Hetal Oleary, Sr Updated. Lasix 20mg IV ordered x1 dose.     CARL Horvath  Critical Care NP  Dior 227: [phone]  Pgr: [phone]

## 2018-03-13 NOTE — BRIEF OP NOTE
Pre-Operative Diagnosis: Absence of both breasts [Z90.13]  BRCA2 gene mutation positive [Z15.01, Z15.09]     Post-Operative Diagnosis: Absence of both breasts [Z90.13]BRCA2 gene mutation positive [Z15.01, Z15.09]     Procedure Performed:   Procedure(s):

## 2018-03-14 LAB
BUN BLD-MCNC: 14 MG/DL (ref 8–20)
CALCIUM BLD-MCNC: 8.1 MG/DL (ref 8.3–10.3)
CHLORIDE: 105 MMOL/L (ref 101–111)
CO2: 28 MMOL/L (ref 22–32)
CREAT BLD-MCNC: 0.69 MG/DL (ref 0.55–1.02)
ERYTHROCYTE [DISTWIDTH] IN BLOOD BY AUTOMATED COUNT: 14.3 % (ref 11.5–16)
GLUCOSE BLD-MCNC: 108 MG/DL (ref 70–99)
HAV IGM SER QL: 1.8 MG/DL (ref 1.7–3)
HCT VFR BLD AUTO: 34.9 % (ref 34–50)
HGB BLD-MCNC: 10.9 G/DL (ref 12–16)
MCH RBC QN AUTO: 28 PG (ref 27–33.2)
MCHC RBC AUTO-ENTMCNC: 31.2 G/DL (ref 31–37)
MCV RBC AUTO: 89.7 FL (ref 81–100)
PLATELET # BLD AUTO: 173 10(3)UL (ref 150–450)
POTASSIUM SERPL-SCNC: 3.9 MMOL/L (ref 3.6–5.1)
RBC # BLD AUTO: 3.89 X10(6)UL (ref 3.8–5.1)
RED CELL DISTRIBUTION WIDTH-SD: 47 FL (ref 35.1–46.3)
SODIUM SERPL-SCNC: 137 MMOL/L (ref 136–144)
WBC # BLD AUTO: 10.6 X10(3) UL (ref 4–13)

## 2018-03-14 RX ORDER — FUROSEMIDE 10 MG/ML
20 INJECTION INTRAMUSCULAR; INTRAVENOUS
Status: DISCONTINUED | OUTPATIENT
Start: 2018-03-14 | End: 2018-03-16

## 2018-03-14 RX ORDER — HYDROMORPHONE HYDROCHLORIDE 1 MG/ML
1.2 INJECTION, SOLUTION INTRAMUSCULAR; INTRAVENOUS; SUBCUTANEOUS EVERY 2 HOUR PRN
Status: DISPENSED | OUTPATIENT
Start: 2018-03-14 | End: 2018-03-16

## 2018-03-14 RX ORDER — HYDROMORPHONE HYDROCHLORIDE 1 MG/ML
0.4 INJECTION, SOLUTION INTRAMUSCULAR; INTRAVENOUS; SUBCUTANEOUS EVERY 2 HOUR PRN
Status: DISPENSED | OUTPATIENT
Start: 2018-03-14 | End: 2018-03-16

## 2018-03-14 RX ORDER — HYDROMORPHONE HYDROCHLORIDE 1 MG/ML
0.8 INJECTION, SOLUTION INTRAMUSCULAR; INTRAVENOUS; SUBCUTANEOUS EVERY 2 HOUR PRN
Status: DISPENSED | OUTPATIENT
Start: 2018-03-14 | End: 2018-03-16

## 2018-03-14 RX ORDER — HYDROCODONE BITARTRATE AND ACETAMINOPHEN 5; 325 MG/1; MG/1
1-2 TABLET ORAL EVERY 4 HOURS PRN
Status: DISCONTINUED | OUTPATIENT
Start: 2018-03-14 | End: 2018-03-17

## 2018-03-14 RX ORDER — PANTOPRAZOLE SODIUM 40 MG/1
40 TABLET, DELAYED RELEASE ORAL
Status: DISCONTINUED | OUTPATIENT
Start: 2018-03-14 | End: 2018-03-17

## 2018-03-14 NOTE — PROGRESS NOTES
[facility]  Progress Note    Cj Mccurdy Patient Status:  Inpatient    1970 MRN [id number]   [facility] 4SW-A Attending Genaro Bobo MD   Hosp Day # 2 PCP Mattie Kent MD     Subjective:  Cj Mccurdy is a(n) deficits excluding right hand. She does note numbness in her right hand involving the radial side. Strength is improved today. Lab Data Review:  Recent Labs   Lab  03/13/18   0446  03/14/18   0441   RBC  4.75  3.89   HGB  13.5  10.9*   HCT  41.5  34. 9

## 2018-03-14 NOTE — PROGRESS NOTES
[facility] Note: Route Optimization for Pantoprazole (PROTONIX)    Patient is currently on Pantoprazole (PROTONIX) 40 mg IV every 24 hours.    The patient meets the criteria to convert to the oral equivalent as established by the IV to Oral conversion pro

## 2018-03-14 NOTE — PLAN OF CARE
PAIN - ADULT    • Verbalizes/displays adequate comfort level or patient's stated pain goal Not Progressing        RESPIRATORY - ADULT    • Achieves optimal ventilation and oxygenation Not Progressing          GASTROINTESTINAL - ADULT    • Minimal or absenc

## 2018-03-14 NOTE — PLAN OF CARE
PAIN - ADULT    • Verbalizes/displays adequate comfort level or patient's stated pain goal Progressing          SKIN/TISSUE INTEGRITY - ADULT    • Skin integrity remains intact Progressing    • Incision(s), wounds(s) or drain site(s) healing without S/S of

## 2018-03-14 NOTE — PAYOR COMM NOTE
--------------  CONTINUED STAY REVIEW    Payor: Laura RIOS/HEIDY  Subscriber #:  [id number]  Authorization Number: [id number]    Admit date: 3/12/18  Admit time: 2047    Admitting Physician: Hali Westbrook MD  Attending Physician:  MD FABIOLA Reynaga Dana Aleman RN    3/13/2018 1943 Given 0.8 mg Intravenous Dana Aleman, TYRA    3/13/2018 1623 Given 0.8 mg Intravenous Cortez Harris RN      HYDROmorphone HCl (DILAUDID) 1 MG/ML injection 1.2 mg     Date Action Dose Route User    3/14/2018 135 Milagros Sauer, RN    3/13/2018 1827 Given 4 mg Oral Nicholas Landaverde RN            Plan: 3/13  POD # 1 s/p BL EMPERATRIZ free flap  continue flap monitoring   Q 1 hour Dopplers today, VTE prophylaxis with Lovenox, ASA 81mg for anti-platelet effect, Continue Vioptix mon

## 2018-03-14 NOTE — PROGRESS NOTES
PRS  POD # 2 s/p BL EMPERATRIZ free flap  Doing well  Pain controlled  PE: flaps warm well perfused, doppler signal +, viotix 74% R 64% L  abd incision CDI breast incisions CDI  A/P: POD # 2  1. Q 2 hour Dopplers today  2. VTE prophylaxis with Lovenox  3.  ASA 81

## 2018-03-15 LAB
ALBUMIN SERPL-MCNC: 2.3 G/DL (ref 3.5–4.8)
ALP LIVER SERPL-CCNC: 66 U/L (ref 39–100)
ALT SERPL-CCNC: 15 U/L (ref 14–54)
AST SERPL-CCNC: 44 U/L (ref 15–41)
BILIRUB SERPL-MCNC: 0.5 MG/DL (ref 0.1–2)
BUN BLD-MCNC: 10 MG/DL (ref 8–20)
CALCIUM BLD-MCNC: 8.4 MG/DL (ref 8.3–10.3)
CHLORIDE: 100 MMOL/L (ref 101–111)
CO2: 26 MMOL/L (ref 22–32)
CREAT BLD-MCNC: 0.45 MG/DL (ref 0.55–1.02)
ERYTHROCYTE [DISTWIDTH] IN BLOOD BY AUTOMATED COUNT: 13.8 % (ref 11.5–16)
GLUCOSE BLD-MCNC: 94 MG/DL (ref 70–99)
HAV IGM SER QL: 2 MG/DL (ref 1.7–3)
HCT VFR BLD AUTO: 33.9 % (ref 34–50)
HGB BLD-MCNC: 11.2 G/DL (ref 12–16)
M PROTEIN MFR SERPL ELPH: 5.9 G/DL (ref 6.1–8.3)
MCH RBC QN AUTO: 28.9 PG (ref 27–33.2)
MCHC RBC AUTO-ENTMCNC: 33 G/DL (ref 31–37)
MCV RBC AUTO: 87.6 FL (ref 81–100)
PHOSPHATE SERPL-MCNC: 2.7 MG/DL (ref 2.5–4.9)
PLATELET # BLD AUTO: 172 10(3)UL (ref 150–450)
POTASSIUM SERPL-SCNC: 3.8 MMOL/L (ref 3.6–5.1)
RBC # BLD AUTO: 3.87 X10(6)UL (ref 3.8–5.1)
RED CELL DISTRIBUTION WIDTH-SD: 43.8 FL (ref 35.1–46.3)
SODIUM SERPL-SCNC: 135 MMOL/L (ref 136–144)
WBC # BLD AUTO: 9.9 X10(3) UL (ref 4–13)

## 2018-03-15 PROCEDURE — 99223 1ST HOSP IP/OBS HIGH 75: CPT | Performed by: OTHER

## 2018-03-15 RX ORDER — ONDANSETRON 4 MG/1
4 TABLET, FILM COATED ORAL EVERY 8 HOURS PRN
Qty: 20 TABLET | Refills: 1 | Status: SHIPPED | OUTPATIENT
Start: 2018-03-15 | End: 2018-07-20

## 2018-03-15 RX ORDER — ACETAMINOPHEN 500 MG
500 TABLET ORAL EVERY 6 HOURS PRN
Status: DISCONTINUED | OUTPATIENT
Start: 2018-03-15 | End: 2018-03-17

## 2018-03-15 RX ORDER — HYDROCODONE BITARTRATE AND ACETAMINOPHEN 5; 325 MG/1; MG/1
1-2 TABLET ORAL EVERY 4 HOURS PRN
Qty: 40 TABLET | Refills: 0 | Status: SHIPPED | OUTPATIENT
Start: 2018-03-15 | End: 2018-07-20

## 2018-03-15 RX ORDER — ASPIRIN 81 MG/1
81 TABLET, CHEWABLE ORAL DAILY
Qty: 14 TABLET | Refills: 0 | Status: SHIPPED | OUTPATIENT
Start: 2018-03-16 | End: 2018-10-16

## 2018-03-15 RX ORDER — PSEUDOEPHEDRINE HCL 30 MG
100 TABLET ORAL 2 TIMES DAILY
Qty: 60 CAPSULE | Refills: 0 | Status: SHIPPED | OUTPATIENT
Start: 2018-03-15 | End: 2018-07-20

## 2018-03-15 RX ORDER — ENOXAPARIN SODIUM 100 MG/ML
40 INJECTION SUBCUTANEOUS DAILY
Qty: 14 SYRINGE | Refills: 0 | Status: SHIPPED | OUTPATIENT
Start: 2018-03-16 | End: 2018-07-20

## 2018-03-15 NOTE — PLAN OF CARE
GASTROINTESTINAL - ADULT    • Minimal or absence of nausea and vomiting Progressing    • Maintains or returns to baseline bowel function Progressing        GENITOURINARY - ADULT    • Absence of urinary retention Progressing        HEMATOLOGIC - ADULT    • SKIN/TISSUE INTEGRITY - ADULT    • Skin integrity remains intact Progressing    • Incision(s), wounds(s) or drain site(s) healing without S/S of infection Progressing    • Oral mucous membranes remain intact Progressing          GASTROINTESTINAL - ADULT

## 2018-03-15 NOTE — OCCUPATIONAL THERAPY NOTE
OCCUPATIONAL THERAPY EVALUATION - INPATIENT     Room Number: 470/470-A  Evaluation Date: 3/15/2018  Type of Evaluation: Initial  Presenting Problem: s/p EMPERATRIZ Flap    Physician Order: IP Consult to Occupational Therapy  Reason for Therapy: ADL/IADL Dysfunct salpingectomy  No date: HYSTEROSCOPY  07/31/2017: INSERT TISSUE EXPANDER(S) Bilateral      Comment: 133 MX-16-T 850 mL tissue expander   07/31/2017: MASTECTOMY LEFT Left  07/31/2017: MASTECTOMY RIGHT Right  No date: OTHER      Comment: Exploratory Lap  N restrictions (shoulder flexion to 90 deg only)    Upper extremity strength is within functional limits within allowed ranges. Including right hand wrist, finger flexion/ext and thumb/finger opposition.     COORDINATION  Gross Motor    WFL    Fine Motor declined getting out of bed for functional mobility evaluation d/t feeling nauseous. Patient required moderate assist and increased time for simulated donning/doffing of shirt.     Patient End of Session: In bed;Needs met;Call light within reach;RN aware o limited treatment options    Overall Complexity LOW     OT Discharge Recommendations: Home;Outpatient OT (for right hand numbness follow-up)  OT Device Recommendations: Raised toilet seat; Shower chair    PLAN  OT Treatment Plan: Balance activities; Energy c

## 2018-03-15 NOTE — PROGRESS NOTES
PLASTICS     This is a 50year old female that is POD #3 S/P her delayed bilateral breast reconstruction with EMPERATRIZ free flaps. She feels her pain is well managed currently. She has nausea and has been unable to eat yet because of this. No vomiting. Baldomero stroud potentially due to positioning. Occupational therapy to fit for cock up wrist splint  11. Encourage incentive spirometry - limited by pain with inspiration over the sternum  12.  Goal for D/C tomorrow      Dr. Justice King: I saw the pt and examined her I agree wi

## 2018-03-15 NOTE — PLAN OF CARE
GASTROINTESTINAL - ADULT    • Minimal or absence of nausea and vomiting Not Progressing          GASTROINTESTINAL - ADULT    • Maintains or returns to baseline bowel function Progressing        HEMATOLOGIC - ADULT    • Maintains hematologic stability Progr

## 2018-03-15 NOTE — CONSULTS
[id number] Preliminary Note    Hari Lua Patient Status:  Inpatient    1970 MRN [id number]   [facility] 4SW-A Attending Jarad Lew MD   Hosp Day # 3 PCP Marlon Medellin MD     REASON FOR EVALUATION:  P are improving but thought this was taking more time than expected as it had been a few days; she denies any symptoms in R leg, left leg, face, or other arm.      Currently, patient otherwise denies any recent fevers, chills, nausea, double vision/ blurry vi Allergies    MEDICATIONS:    Prior to Admission Medications:  HYDROcodone-acetaminophen 5-325 MG Oral Tab Take 1-2 tablets by mouth every 4 (four) hours as needed.    [START ON 3/16/2018] aspirin 81 MG Oral Chew Tab Chew 1 tablet (81 mg total) by mouth lisa nourished, no acute distress  HEENT: normocephalic  Heart; normal K5/Q4, regular rate and rhythm  Lungs: clear to auscultation bilaterally  Extremities: no edema, peripheral pulses intact  Abd: soft, nt/nd; +bs    Neck: supple, full range of motion; no car the fact that nerve conduction study / EMG may not be diagnostic for plexus injury at this time but that if her symptoms worsen or persist, this can be pursued in the outpatient setting.      Plan  - as above   - wrist splint / brace ok from neurologic view

## 2018-03-15 NOTE — PROGRESS NOTES
[facility]  Progress Note    Ozzyabhijeet Nicholas Patient Status:  Inpatient    1970 MRN [id number]   [facility] 4SW-A Attending Brent Parry MD   Hosp Day # 3 PCP Grady Ellsworth MD     Subjective:  Merariroque Nicholas is a(n) specific etiology for her nausea.    Extremity: No edema, no cyanosis   Neurological: Alert, interactive, no focal deficits-strength in her right hand improved and almost normal.    Lab Data Review:  Recent Labs   Lab  03/13/18   0446  03/14/18   0441  03/1 balance/normal renal function.  DVT prophylaxis per the surgical service.  Continue to follow closely.     Marisol Carlos SR.  3/15/2018  7:42 AM

## 2018-03-15 NOTE — PROGRESS NOTES
[id number] [facility] Preliminary Note    Lexi Elam Patient Status:  Inpatient    1970 MRN [id number]   [facility] 4SW-A Attending Mary Grace Cole MD   Hosp Day # 3 PCP Nikki Tran MD     REASON FOR EVALUATION:  P kidney stones 2013    passed on her own   • History of seizures as a child     14y/o grand mal seizure x1; medication for 1 year; no known cause; no longer on medication   • Migraines    • Visual impairment     glasses       PAST SURGICAL HISTORY:  Past Frazier mg Intravenous Q2H PRN   lactated ringers infusion  Intravenous Continuous   TiZANidine HCl (ZANAFLEX) tab 4 mg 4 mg Oral TID PRN   DiphenhydrAMINE HCl (BENADRYL) injection 25 mg 25 mg Intravenous Q4H PRN   ondansetron HCl (ZOFRAN) injection 4 mg 4 mg Intr Micky  3/15/2018, 12:49 PM  Spectre [id number]

## 2018-03-16 LAB
BASOPHILS # BLD AUTO: 0.05 X10(3) UL (ref 0–0.1)
BASOPHILS NFR BLD AUTO: 0.8 %
BUN BLD-MCNC: 9 MG/DL (ref 8–20)
CALCIUM BLD-MCNC: 8.6 MG/DL (ref 8.3–10.3)
CHLORIDE: 103 MMOL/L (ref 101–111)
CO2: 30 MMOL/L (ref 22–32)
CREAT BLD-MCNC: 0.53 MG/DL (ref 0.55–1.02)
EOSINOPHIL # BLD AUTO: 0.17 X10(3) UL (ref 0–0.3)
EOSINOPHIL NFR BLD AUTO: 2.7 %
ERYTHROCYTE [DISTWIDTH] IN BLOOD BY AUTOMATED COUNT: 14 % (ref 11.5–16)
GLUCOSE BLD-MCNC: 108 MG/DL (ref 70–99)
HAV IGM SER QL: 2.3 MG/DL (ref 1.7–3)
HCT VFR BLD AUTO: 33.5 % (ref 34–50)
HGB BLD-MCNC: 10.7 G/DL (ref 12–16)
IMMATURE GRANULOCYTE COUNT: 0.02 X10(3) UL (ref 0–1)
IMMATURE GRANULOCYTE RATIO %: 0.3 %
LYMPHOCYTES # BLD AUTO: 2 X10(3) UL (ref 0.9–4)
LYMPHOCYTES NFR BLD AUTO: 32.2 %
MCH RBC QN AUTO: 28.2 PG (ref 27–33.2)
MCHC RBC AUTO-ENTMCNC: 31.9 G/DL (ref 31–37)
MCV RBC AUTO: 88.4 FL (ref 81–100)
MONOCYTES # BLD AUTO: 0.56 X10(3) UL (ref 0.1–1)
MONOCYTES NFR BLD AUTO: 9 %
NEUTROPHIL ABS PRELIM: 3.42 X10 (3) UL (ref 1.3–6.7)
NEUTROPHILS # BLD AUTO: 3.42 X10(3) UL (ref 1.3–6.7)
NEUTROPHILS NFR BLD AUTO: 55 %
PLATELET # BLD AUTO: 230 10(3)UL (ref 150–450)
POTASSIUM SERPL-SCNC: 3.5 MMOL/L (ref 3.6–5.1)
RBC # BLD AUTO: 3.79 X10(6)UL (ref 3.8–5.1)
RED CELL DISTRIBUTION WIDTH-SD: 45.5 FL (ref 35.1–46.3)
SODIUM SERPL-SCNC: 140 MMOL/L (ref 136–144)
WBC # BLD AUTO: 6.2 X10(3) UL (ref 4–13)

## 2018-03-16 RX ORDER — ONDANSETRON 2 MG/ML
4 INJECTION INTRAMUSCULAR; INTRAVENOUS EVERY 4 HOURS PRN
Status: DISCONTINUED | OUTPATIENT
Start: 2018-03-16 | End: 2018-03-17

## 2018-03-16 RX ORDER — HYDRALAZINE HYDROCHLORIDE 20 MG/ML
5 INJECTION INTRAMUSCULAR; INTRAVENOUS EVERY 4 HOURS PRN
Status: DISCONTINUED | OUTPATIENT
Start: 2018-03-16 | End: 2018-03-17

## 2018-03-16 NOTE — PROGRESS NOTES
[facility]  Progress Note    Omar Goss Patient Status:  Inpatient    1970 MRN [id number]   [facility] 4SW-A Attending Michael Mendez MD   Hosp Day # 4 PCP Ryan Tomlinson MD     Subjective:  Omar Goss is a(n) focal deficits    Lab Data Review:  Recent Labs   Lab  03/14/18   0441  03/15/18   0439   RBC  3.89  3.87   HGB  10.9*  11.2*   HCT  34.9  33.9*   MCV  89.7  87.6   MCH  28.0  28.9   MCHC  31.2  33.0   RDW  14.3  13.8   WBC  10.6  9.9   PLT  173.0  172.0

## 2018-03-16 NOTE — PROGRESS NOTES
PLASTICS     This is a 50year old female that is POD #4 S/P her delayed bilateral breast reconstruction with EMPERATRIZ free flaps. She feels her pain is well managed currently. She has nausea and has been unable to eat very much because of this. No vomiting.  D management plan today - oral pain meds in anticipation of D/C  6. Encouraged up to chair for all meals and ambulation  7. Rx's for Norco, Colace, Zofran, ASA 81mg, and Lovenox injections in chart for D/C  8. Continue general diet  9.  All medical management

## 2018-03-16 NOTE — CM/SW NOTE
Patient was screened during rounds and no needs are identified at this time. RN to contact SW/CM if needs arise.   Rahel Rayo, 03/16/18, 1:14 PM

## 2018-03-16 NOTE — PLAN OF CARE
GASTROINTESTINAL - ADULT    • Minimal or absence of nausea and vomiting Not Progressing        PAIN - ADULT    • Verbalizes/displays adequate comfort level or patient's stated pain goal Not Progressing        Patient/Family Goals    • Patient/Family Long T

## 2018-03-16 NOTE — OCCUPATIONAL THERAPY NOTE
OCCUPATIONAL THERAPY TREATMENT NOTE - INPATIENT     Room Number: 470/470-A  Session: 1   Number of Visits to Meet Established Goals: 2    Presenting Problem: s/p EMPERATRIZ Flap    History related to current admission:  Pt is 50year old female admitted on 3/12/ Comment: 133 MX-16-T 850 mL tissue expander   07/31/2017: MASTECTOMY LEFT Left  07/31/2017: MASTECTOMY RIGHT Right  No date: OTHER      Comment: Exploratory Lap  No date: TONSILLECTOMY    SUBJECTIVE  Patient reports feeling better today.   \"I will get thro LOB, supervision and no AD. On/off regular toilet with supervision. Toileting with mod I  Patient tolerated standing x >4 min without c/o fatigue.   Donned/doffed socks using crossed-leg method while at EOB, mod I.  EOB to supine with supervision and cuei

## 2018-03-16 NOTE — PAYOR COMM NOTE
--------------  CONTINUED STAY REVIEW    Payor: Donnell RIOS/HEIDY  Subscriber #:  [id number]  Authorization Number: [id number]    Admit date: 3/12/18  Admit time: 2047    Admitting Physician: Court Rayo MD  Attending Physician:  MD FABIOLA Johnson plan today - continue Dilaudid panel, oral Norco, and muscle relaxer as needed. Goal to transition to all po pain meds by this evening or tomorrow  6. Encouraged up to chair for all meals and ambulation  7. D/C Barreto  8. Continue general diet  9.  All medic continues  Maximal temperature 99.4° in the last 24 hours. Vital signs demonstrate labile but generally normal blood pressure readings. She continues in a sinus rhythm.     Plan: Continue to encourage deep breathing/incentive spirometry.   Multivitamin re mg [facility]    3/15/2018 2031 Given 100 mg Oral Beverly Rodríguez, RN      Enoxaparin Sodium (LOVENOX) 40 MG/0.4ML injection 40 mg     Date Action Dose Route User    3/16/2018 1047 Given 40 mg Subcutaneous (Left Lower Abdomen) Ruba Jennings, Tasneem An RN    3/15/2018 2203 Given 4 mg Intravenous Tasneem An RN    3/15/2018 1453 Given 4 mg Intravenous Cesar Horan RN      Pantoprazole Sodium (PROTONIX) EC tab 40 mg     Date Action Dose Route User    3/16/2018 0650 Given 36 m

## 2018-03-17 VITALS
TEMPERATURE: 99 F | WEIGHT: 276.69 LBS | RESPIRATION RATE: 21 BRPM | OXYGEN SATURATION: 95 % | BODY MASS INDEX: 39.61 KG/M2 | HEART RATE: 74 BPM | HEIGHT: 70 IN | SYSTOLIC BLOOD PRESSURE: 152 MMHG | DIASTOLIC BLOOD PRESSURE: 86 MMHG

## 2018-03-17 LAB
BUN BLD-MCNC: 10 MG/DL (ref 8–20)
CALCIUM BLD-MCNC: 8.8 MG/DL (ref 8.3–10.3)
CHLORIDE: 105 MMOL/L (ref 101–111)
CO2: 29 MMOL/L (ref 22–32)
CREAT BLD-MCNC: 0.6 MG/DL (ref 0.55–1.02)
GLUCOSE BLD-MCNC: 106 MG/DL (ref 70–99)
HAV IGM SER QL: 2.2 MG/DL (ref 1.7–3)
HGB BLD-MCNC: 11.6 G/DL (ref 12–16)
POTASSIUM SERPL-SCNC: 3.5 MMOL/L (ref 3.6–5.1)
SODIUM SERPL-SCNC: 142 MMOL/L (ref 136–144)

## 2018-03-17 RX ORDER — POTASSIUM CHLORIDE 20 MEQ/1
40 TABLET, EXTENDED RELEASE ORAL ONCE
Status: COMPLETED | OUTPATIENT
Start: 2018-03-17 | End: 2018-03-17

## 2018-03-17 NOTE — PLAN OF CARE
Pt a&ox4, hemodynamically stable. States pain well controlled with PO pain meds. Incisions intact/well approximated. Bilateral breast flaps well perfused. All discharge instructions and paperwork provided to patient and spouse at bedside.   All questi

## 2018-03-17 NOTE — PROGRESS NOTES
[facility]  Progress Note    Cj Mccurdy Patient Status:  Inpatient    1970 MRN [id number]   [facility] 4SW-A Attending Genaro Bobo MD   Hosp Day # 5 PCP Mattie Kent MD     Subjective:  Cj Mccurdy is a(n) GLU  94  108*  106*   BUN  10  9  10   CREATSERUM  0.45*  0.53*  0.60   GFRAA  137  130  125   GFRNAA  119  113  108   CA  8.4  8.6  8.8   NA  135*  140  142   K  3.8  3.5*  3.5*   CL  100*  103  105   CO2  26.0  30.0  29.0     No results for input(s): A

## 2018-03-17 NOTE — PLAN OF CARE
PAIN - ADULT    • Verbalizes/displays adequate comfort level or patient's stated pain goal Progressing          RESPIRATORY - ADULT    • Achieves optimal ventilation and oxygenation Progressing          SKIN/TISSUE INTEGRITY - ADULT    • Skin integrity rem

## 2018-03-17 NOTE — DISCHARGE SUMMARY
The patient is a 50year-old female with a history of BRCA mutation. The patient was admitted to the hospital and taken to the operating room on 3/12/18  for delayed bilateral breast reconstruction with abdominal free flaps.  The patient was admitted post-o

## 2018-03-19 NOTE — PAYOR COMM NOTE
--------------  DISCHARGE REVIEW    Payor: Markie RIOS/HEIDY  Subscriber #:  [id number]  Authorization Number: [id number]    Admit date: 3/12/18  Admit time:  2047  Discharge Date: 3/17/2018  3:39 PM     Admitting Physician: Severa Ogle., MD  Attending

## 2018-03-23 ENCOUNTER — OFFICE VISIT (OUTPATIENT)
Dept: SURGERY | Facility: CLINIC | Age: 48
End: 2018-03-23

## 2018-03-23 VITALS
WEIGHT: 267.19 LBS | HEIGHT: 70 IN | HEART RATE: 82 BPM | SYSTOLIC BLOOD PRESSURE: 115 MMHG | TEMPERATURE: 98 F | OXYGEN SATURATION: 96 % | BODY MASS INDEX: 38.25 KG/M2 | RESPIRATION RATE: 16 BRPM | DIASTOLIC BLOOD PRESSURE: 81 MMHG

## 2018-03-23 DIAGNOSIS — Z90.13 ABSENCE OF BOTH BREASTS: Primary | ICD-10-CM

## 2018-03-23 PROCEDURE — 99024 POSTOP FOLLOW-UP VISIT: CPT | Performed by: PHYSICIAN ASSISTANT

## 2018-03-23 RX ORDER — HYDROCODONE BITARTRATE AND ACETAMINOPHEN 5; 325 MG/1; MG/1
1-2 TABLET ORAL EVERY 4 HOURS PRN
Qty: 30 TABLET | Refills: 0 | Status: SHIPPED | OUTPATIENT
Start: 2018-03-23 | End: 2018-07-20

## 2018-03-23 NOTE — PROGRESS NOTES
This is a 50year old female that is 11 days s/p her bilateral delayed breast reconstruction with abdominal free flaps. She continues Norco occasionally for pain, mainly at night. She continues her daily Lovenox injections and 81mg ASA.  She denies any prob consecutive days. She is scheduled to see Dr. Natividad Velasco on 4/4/18 for further post-op and wound check. I encouraged her to put neosporin on all incisions 2-3x/day and avoid clothes rubbing on the abdominal incision.  Encouraged good nutrition for maximum healin

## 2018-03-26 ENCOUNTER — TELEPHONE (OUTPATIENT)
Dept: SURGERY | Facility: CLINIC | Age: 48
End: 2018-03-26

## 2018-03-26 ENCOUNTER — NURSE ONLY (OUTPATIENT)
Dept: SURGERY | Facility: CLINIC | Age: 48
End: 2018-03-26

## 2018-03-26 NOTE — PROGRESS NOTES
The patient presents today for TANNA drain evaluation s/p delayed bilateral breast reconstruction with abdominal free flap-  Per patient, drain output record reflects the following measurements:    Drain Log                3/24                3/25     Right a

## 2018-03-26 NOTE — TELEPHONE ENCOUNTER
Returned Pt call regarding drain output. Pt stated that she believes that her Right Abdominal Drain is ready to come out today. Friday 3/23/18Rose NORIEGA removed 3 drains, but her Right Abd. Drain was at 27.  Lucretia Troncoso told Pt that she could come to get it

## 2018-03-28 NOTE — PROGRESS NOTES
Aldair Ashby called and is ready to transition to Tramadol now instead of Norco, as we discussed at her office visit. Tramadol 50mg 1-2 tabs po q4-6hrs #40 no refills    Called into [facility].   [phone]

## 2018-03-29 ENCOUNTER — TELEPHONE (OUTPATIENT)
Dept: SURGERY | Facility: CLINIC | Age: 48
End: 2018-03-29

## 2018-03-29 NOTE — TELEPHONE ENCOUNTER
Pt calling to inform us that she is extremely nauseated and feels light headed after taking Tramadol 50mg for pain. Pt instructed to stop taking Tramadol and to take Norco 5/325mg for the pain.  Per HOLLY David-  Pt will be able to  an RX for

## 2018-04-04 ENCOUNTER — OFFICE VISIT (OUTPATIENT)
Dept: SURGERY | Facility: CLINIC | Age: 48
End: 2018-04-04

## 2018-04-04 DIAGNOSIS — Z90.13 ABSENCE OF BOTH BREASTS: Primary | ICD-10-CM

## 2018-04-04 PROCEDURE — 99024 POSTOP FOLLOW-UP VISIT: CPT | Performed by: SURGERY

## 2018-04-04 NOTE — PROGRESS NOTES
Pavan Thomas is a 50year old female who presents today for a follow-up after BL EMPERATRIZ free flap 4 weeks ago  She denies fever and chills. She denies nausea, vomiting, diarrhea or constipation. Her pain is controlled.     Pt reports residual numbne

## 2018-04-19 ENCOUNTER — MED REC SCAN ONLY (OUTPATIENT)
Dept: FAMILY MEDICINE CLINIC | Facility: CLINIC | Age: 48
End: 2018-04-19

## 2018-05-30 ENCOUNTER — OFFICE VISIT (OUTPATIENT)
Dept: SURGERY | Facility: CLINIC | Age: 48
End: 2018-05-30

## 2018-05-30 DIAGNOSIS — Z90.13 ABSENCE OF BOTH BREASTS: Primary | ICD-10-CM

## 2018-05-30 PROCEDURE — 99024 POSTOP FOLLOW-UP VISIT: CPT | Performed by: SURGERY

## 2018-05-30 NOTE — PROGRESS NOTES
Of note, the patient will return for pre-op in July for:  Revision of bilateral reconstructed breasts with reduction, excision of excess skin bilateral flanks, general, 4 hours.      We are currently holding a tentative surgery date of 8/9/2018-

## 2018-05-30 NOTE — CONSULTS
Yunior Nicholas is a 50year old female who presents today for a follow-up after BL EMPERATRIZ free flap    She denies fever and chills. She denies nausea, vomiting, diarrhea or constipation. Her pain is controlled.         Physical Exam     Surgical incis

## 2018-07-18 ENCOUNTER — OFFICE VISIT (OUTPATIENT)
Dept: SURGERY | Facility: CLINIC | Age: 48
End: 2018-07-18
Payer: COMMERCIAL

## 2018-07-18 DIAGNOSIS — Z90.13 ABSENCE OF BOTH BREASTS: Primary | ICD-10-CM

## 2018-07-18 PROCEDURE — 99213 OFFICE O/P EST LOW 20 MIN: CPT | Performed by: SURGERY

## 2018-07-18 NOTE — PROGRESS NOTES
Surgery and wash instructions verbally reviewed with the patient and written instructions were also provided. The patient understands the need to obtain medical clearance for this procedure and plans to see Dr. Philip Villareal for this.  Informed consent for the pr

## 2018-07-18 NOTE — CONSULTS
Nigel Chow is a 50year old female who presents today for a follow-up after BL EMPERATRIZ free flap     She denies fever and chills. She denies nausea, vomiting, diarrhea or constipation.    Her pain is controlled.             Physical Exam      Surgica

## 2018-07-18 NOTE — PATIENT INSTRUCTIONS
Surgeon: Dr. Que Smith.  Bora Wu, PhD     Tel:  [phone]    Fax: [phone]     Surgery/Procedure: Revision of bilateral reconstructed breasts with reduction, excision of excess skin of bilateral flanks, general anesthesia, 4 hours        Hospital:  [facility]

## 2018-07-20 ENCOUNTER — OFFICE VISIT (OUTPATIENT)
Dept: FAMILY MEDICINE CLINIC | Facility: CLINIC | Age: 48
End: 2018-07-20
Payer: COMMERCIAL

## 2018-07-20 VITALS
RESPIRATION RATE: 12 BRPM | SYSTOLIC BLOOD PRESSURE: 138 MMHG | HEIGHT: 70 IN | BODY MASS INDEX: 39.71 KG/M2 | DIASTOLIC BLOOD PRESSURE: 85 MMHG | WEIGHT: 277.38 LBS | HEART RATE: 66 BPM | TEMPERATURE: 98 F

## 2018-07-20 DIAGNOSIS — E66.9 OBESITY (BMI 35.0-39.9 WITHOUT COMORBIDITY): Primary | ICD-10-CM

## 2018-07-20 PROCEDURE — 99214 OFFICE O/P EST MOD 30 MIN: CPT | Performed by: FAMILY MEDICINE

## 2018-07-20 RX ORDER — PHENTERMINE HYDROCHLORIDE 37.5 MG/1
37.5 TABLET ORAL
Qty: 30 TABLET | Refills: 0 | Status: SHIPPED
Start: 2018-07-20 | End: 2018-08-15

## 2018-07-20 NOTE — PROGRESS NOTES
Maritza Marie is a 50year old female. CC:  Patient presents with:  Weight Loss  Follow - Up: per pt      HPI:  She would like to try a weight loss medication, yet is also hesitant. She states takes in about 1600 calories per day.  She is not ab Removal of                right breast tissue expander, Delayed right                breast reconstruction with EMPERATRIZ flap  No date: TONSILLECTOMY   Family History   Problem Relation Age of Onset   • Breast Cancer Mother      DCIS   • Other Ruthy Gentle Mother encounter. None    Meds & Refills for this Visit:  Signed Prescriptions Disp Refills    Phentermine HCl 37.5 MG Oral Tab 30 tablet 0      Sig: Take 1 tablet (37.5 mg total) by mouth every morning before breakfast.             No Follow-up on file.

## 2018-08-15 ENCOUNTER — OFFICE VISIT (OUTPATIENT)
Dept: FAMILY MEDICINE CLINIC | Facility: CLINIC | Age: 48
End: 2018-08-15
Payer: COMMERCIAL

## 2018-08-15 VITALS
HEIGHT: 70 IN | BODY MASS INDEX: 38.8 KG/M2 | TEMPERATURE: 99 F | DIASTOLIC BLOOD PRESSURE: 88 MMHG | WEIGHT: 271 LBS | SYSTOLIC BLOOD PRESSURE: 130 MMHG

## 2018-08-15 DIAGNOSIS — E66.9 OBESITY (BMI 35.0-39.9 WITHOUT COMORBIDITY): Primary | ICD-10-CM

## 2018-08-15 PROCEDURE — 99214 OFFICE O/P EST MOD 30 MIN: CPT | Performed by: FAMILY MEDICINE

## 2018-08-15 RX ORDER — PHENTERMINE HYDROCHLORIDE 37.5 MG/1
37.5 CAPSULE ORAL EVERY MORNING
Qty: 30 CAPSULE | Refills: 2 | Status: SHIPPED
Start: 2018-08-15 | End: 2018-10-17 | Stop reason: ALTCHOICE

## 2018-08-15 NOTE — PROGRESS NOTES
Beverly Arizmendi is a 50year old female. CC:  Patient presents with:   Follow - Up: per pt      HPI:  F/u Adipex use  Losing Weight:yes  Palpitations:no  Chest pains:no  Abd pains:no  Insomnia:no  Mood changes:no   Allergies:  No Known Allergies capsulectomy right breast, Removal of                right breast tissue expander, Delayed right                breast reconstruction with EMPERATRIZ flap  No date: TONSILLECTOMY   Family History   Problem Relation Age of Onset   • Breast Cancer Mother      DCIS (ADIPEX-P) 37.5 MG Oral Cap 30 capsule 2      Sig: Take 1 capsule (37.5 mg total) by mouth every morning. No Follow-up on file.         Authorized by Luan Buenrostro M.D.

## 2018-09-04 ENCOUNTER — TELEPHONE (OUTPATIENT)
Dept: SURGERY | Facility: CLINIC | Age: 48
End: 2018-09-04

## 2018-09-04 NOTE — TELEPHONE ENCOUNTER
Confirmed date of surgery, 10/2. Patient stated that she has an upcoming appt for her pre-surgical clearance.

## 2018-09-20 ENCOUNTER — OFFICE VISIT (OUTPATIENT)
Dept: FAMILY MEDICINE CLINIC | Facility: CLINIC | Age: 48
End: 2018-09-20
Payer: COMMERCIAL

## 2018-09-20 VITALS
SYSTOLIC BLOOD PRESSURE: 122 MMHG | TEMPERATURE: 98 F | DIASTOLIC BLOOD PRESSURE: 88 MMHG | HEART RATE: 80 BPM | OXYGEN SATURATION: 98 %

## 2018-09-20 DIAGNOSIS — L03.115 CELLULITIS OF RIGHT LOWER EXTREMITY: Primary | ICD-10-CM

## 2018-09-20 DIAGNOSIS — S80.861A INSECT BITE OF RIGHT LOWER LEG WITH INFECTION, INITIAL ENCOUNTER: ICD-10-CM

## 2018-09-20 DIAGNOSIS — L08.9 INSECT BITE OF RIGHT LOWER LEG WITH INFECTION, INITIAL ENCOUNTER: ICD-10-CM
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
## 2018-09-20 PROCEDURE — 99213 OFFICE O/P EST LOW 20 MIN: CPT | Performed by: PHYSICIAN ASSISTANT

## 2018-09-20 RX ORDER — AMOXICILLIN AND CLAVULANATE POTASSIUM 875; 125 MG/1; MG/1
1 TABLET, FILM COATED ORAL 2 TIMES DAILY
Qty: 20 TABLET | Refills: 0 | Status: SHIPPED | OUTPATIENT
Start: 2018-09-20 | End: 2018-09-30

## 2018-09-20 NOTE — PROGRESS NOTES
CHIEF COMPLAINT:   Patient presents with: Insect Bite: x 3 days. HPI:     Maryse Jones is a 50year old female who presents with concerns of R posterior leg infection  1 day with increased redness and ttp.   Noted initial presumed insect bit Years: 20.00        Pack years: 8        Quit date: 10/11/2012        Years since quittin.9      Smokeless tobacco: Former User    Alcohol use: No      Alcohol/week: 0.0 oz    Drug use: No       REVIEW OF SYSTEMS:   GENERAL: feels well otherwise 1 . Augmentin 875 mg twice daily for 10 days. Recommend probiotics while on this medication to prevent antibiotics associated diarrhea or yeast infections.   Examples include yogurt with active live cultures; or in capsule/granule forms such as Florastor, Get prompt medical attention if any of these occur:  · An increase in redness or swelling  · Red streaks in the skin leading away from the abscess  · An increase in local pain or swelling  · Fever of 100.4ºF (38ºC) or higher, or as directed by your healthc · Keep the affected area clean and dry. · Wash your hands with soap and warm water before and after touching your skin. Anyone else who touches your skin should also wash his or her hands. Don't share towels.   Follow-up care  Follow up with your healthcar

## 2018-09-20 NOTE — PATIENT INSTRUCTIONS
1 . Augmentin 875 mg twice daily for 10 days. Recommend probiotics while on this medication to prevent antibiotics associated diarrhea or yeast infections.   Examples include yogurt with active live cultures; or in capsule/granule forms such as Florastor, occur:  · An increase in redness or swelling  · Red streaks in the skin leading away from the abscess  · An increase in local pain or swelling  · Fever of 100.4ºF (38ºC) or higher, or as directed by your healthcare provider  · Pus or fluid coming from the warm water before and after touching your skin. Anyone else who touches your skin should also wash his or her hands. Don't share towels. Follow-up care  Follow up with your healthcare provider, or as advised.  If your infection does not go away on the firs

## 2018-10-16 NOTE — PAT NURSING NOTE
Patient states after 12 hour surgery, numbness r hand.  R thumb, pointer, & middle finger still with numbness

## 2018-10-17 ENCOUNTER — OFFICE VISIT (OUTPATIENT)
Dept: FAMILY MEDICINE CLINIC | Facility: CLINIC | Age: 48
End: 2018-10-17
Payer: COMMERCIAL

## 2018-10-17 VITALS
DIASTOLIC BLOOD PRESSURE: 88 MMHG | HEART RATE: 82 BPM | OXYGEN SATURATION: 100 % | BODY MASS INDEX: 39.84 KG/M2 | TEMPERATURE: 98 F | SYSTOLIC BLOOD PRESSURE: 132 MMHG | HEIGHT: 69 IN | WEIGHT: 269 LBS

## 2018-10-17 DIAGNOSIS — Z01.818 PREOP EXAMINATION: Primary | ICD-10-CM

## 2018-10-17 DIAGNOSIS — Z90.13 S/P MASTECTOMY, BILATERAL: ICD-10-CM

## 2018-10-17 PROCEDURE — 36415 COLL VENOUS BLD VENIPUNCTURE: CPT | Performed by: FAMILY MEDICINE

## 2018-10-17 PROCEDURE — 80048 BASIC METABOLIC PNL TOTAL CA: CPT | Performed by: FAMILY MEDICINE

## 2018-10-17 PROCEDURE — 99243 OFF/OP CNSLTJ NEW/EST LOW 30: CPT | Performed by: FAMILY MEDICINE

## 2018-10-17 NOTE — PROGRESS NOTES
Sadie Keith is a 50year old female.     CC:  Patient presents with:  Pre-Op Exam: per pt      HPI:  I am seeing Sadie Keith for preoperative evaluation at the request of Dr. Maya Catalan MD for my evaluation of the patient's medical prob by Jazzy Lyman MD at [facility] MAIN OR   • OTHER      Exploratory Lap   • OTHER SURGICAL HISTORY  03/12/2018    Left breast deep inferior epigastric  free flap reconstruction from right hemiabdomen, Removal of tissue expander, left breast, Extensive nose, pharynx and TM's  NECK: No lymphadenopathy, thyromegaly or masses  CAR: S1, S2 normal, RRR; no S3, no S4; no click; murmur negative  PULM: clear to auscultation B, no accessory muscle use  GI: normal active BS+, soft, nondistended; no HSM; no masses; proposed procedure and cleared for surgery. A copy of this evaluation has been sent to the surgeon and surgical facility.           Grady Ellsworth MD

## 2018-10-23 ENCOUNTER — TELEPHONE (OUTPATIENT)
Dept: SURGERY | Facility: CLINIC | Age: 48
End: 2018-10-23

## 2018-10-23 NOTE — TELEPHONE ENCOUNTER
Return patients call regarding her approval for surgery. Patient received a letter in the mail stating the surgery was denied for no clinical.  I informed patient that the clinical was submitted and the denial was corrected.   Gave patient the Auth# [id number]

## 2018-10-25 ENCOUNTER — ANESTHESIA EVENT (OUTPATIENT)
Dept: SURGERY | Facility: HOSPITAL | Age: 48
End: 2018-10-25
Payer: COMMERCIAL

## 2018-10-25 ENCOUNTER — ANESTHESIA (OUTPATIENT)
Dept: SURGERY | Facility: HOSPITAL | Age: 48
End: 2018-10-25
Payer: COMMERCIAL

## 2018-10-25 ENCOUNTER — HOSPITAL ENCOUNTER (OUTPATIENT)
Facility: HOSPITAL | Age: 48
Setting detail: HOSPITAL OUTPATIENT SURGERY
Discharge: HOME | End: 2018-10-25
Attending: SURGERY | Admitting: SURGERY
Payer: COMMERCIAL

## 2018-10-25 VITALS
HEIGHT: 70 IN | RESPIRATION RATE: 16 BRPM | HEART RATE: 75 BPM | OXYGEN SATURATION: 98 % | SYSTOLIC BLOOD PRESSURE: 145 MMHG | BODY MASS INDEX: 38.6 KG/M2 | WEIGHT: 269.63 LBS | TEMPERATURE: 98 F | DIASTOLIC BLOOD PRESSURE: 86 MMHG

## 2018-10-25 DIAGNOSIS — Z90.13 ABSENCE OF BOTH BREASTS: ICD-10-CM

## 2018-10-25 PROCEDURE — 0JBL0ZZ EXCISION OF RIGHT UPPER LEG SUBCUTANEOUS TISSUE AND FASCIA, OPEN APPROACH: ICD-10-PCS | Performed by: SURGERY

## 2018-10-25 PROCEDURE — 88305 TISSUE EXAM BY PATHOLOGIST: CPT | Performed by: SURGERY

## 2018-10-25 PROCEDURE — 0HBV0ZZ EXCISION OF BILATERAL BREAST, OPEN APPROACH: ICD-10-PCS | Performed by: SURGERY

## 2018-10-25 PROCEDURE — 0JBM0ZZ EXCISION OF LEFT UPPER LEG SUBCUTANEOUS TISSUE AND FASCIA, OPEN APPROACH: ICD-10-PCS | Performed by: SURGERY

## 2018-10-25 RX ORDER — ONDANSETRON 4 MG/1
4 TABLET, FILM COATED ORAL EVERY 8 HOURS PRN
Qty: 20 TABLET | Refills: 0 | Status: SHIPPED | OUTPATIENT
Start: 2018-10-25 | End: 2018-11-02 | Stop reason: ALTCHOICE

## 2018-10-25 RX ORDER — METOCLOPRAMIDE HYDROCHLORIDE 5 MG/ML
10 INJECTION INTRAMUSCULAR; INTRAVENOUS AS NEEDED
Status: DISCONTINUED | OUTPATIENT
Start: 2018-10-25 | End: 2018-10-25

## 2018-10-25 RX ORDER — HYDROCODONE BITARTRATE AND ACETAMINOPHEN 5; 325 MG/1; MG/1
1 TABLET ORAL AS NEEDED
Status: COMPLETED | OUTPATIENT
Start: 2018-10-25 | End: 2018-10-25

## 2018-10-25 RX ORDER — ONDANSETRON 4 MG/1
8 TABLET, FILM COATED ORAL ONCE
Status: COMPLETED | OUTPATIENT
Start: 2018-10-25 | End: 2018-10-25

## 2018-10-25 RX ORDER — MIDAZOLAM HYDROCHLORIDE 1 MG/ML
1 INJECTION INTRAMUSCULAR; INTRAVENOUS EVERY 5 MIN PRN
Status: DISCONTINUED | OUTPATIENT
Start: 2018-10-25 | End: 2018-10-25

## 2018-10-25 RX ORDER — NALOXONE HYDROCHLORIDE 0.4 MG/ML
80 INJECTION, SOLUTION INTRAMUSCULAR; INTRAVENOUS; SUBCUTANEOUS AS NEEDED
Status: DISCONTINUED | OUTPATIENT
Start: 2018-10-25 | End: 2018-10-25

## 2018-10-25 RX ORDER — DOCUSATE SODIUM 100 MG/1
100 CAPSULE, LIQUID FILLED ORAL 2 TIMES DAILY
Qty: 40 CAPSULE | Refills: 0 | Status: SHIPPED | OUTPATIENT
Start: 2018-10-25 | End: 2018-11-02 | Stop reason: ALTCHOICE

## 2018-10-25 RX ORDER — HEPARIN SODIUM 5000 [USP'U]/ML
5000 INJECTION, SOLUTION INTRAVENOUS; SUBCUTANEOUS ONCE
Status: COMPLETED | OUTPATIENT
Start: 2018-10-25 | End: 2018-10-25

## 2018-10-25 RX ORDER — MEPERIDINE HYDROCHLORIDE 25 MG/ML
12.5 INJECTION INTRAMUSCULAR; INTRAVENOUS; SUBCUTANEOUS AS NEEDED
Status: DISCONTINUED | OUTPATIENT
Start: 2018-10-25 | End: 2018-10-25

## 2018-10-25 RX ORDER — ONDANSETRON 2 MG/ML
4 INJECTION INTRAMUSCULAR; INTRAVENOUS AS NEEDED
Status: DISCONTINUED | OUTPATIENT
Start: 2018-10-25 | End: 2018-10-25

## 2018-10-25 RX ORDER — MORPHINE SULFATE 4 MG/ML
2 INJECTION, SOLUTION INTRAMUSCULAR; INTRAVENOUS EVERY 5 MIN PRN
Status: DISCONTINUED | OUTPATIENT
Start: 2018-10-25 | End: 2018-10-25

## 2018-10-25 RX ORDER — LIDOCAINE HYDROCHLORIDE AND EPINEPHRINE 10; 10 MG/ML; UG/ML
INJECTION, SOLUTION INFILTRATION; PERINEURAL AS NEEDED
Status: DISCONTINUED | OUTPATIENT
Start: 2018-10-25 | End: 2018-10-25 | Stop reason: HOSPADM

## 2018-10-25 RX ORDER — HYDROCODONE BITARTRATE AND ACETAMINOPHEN 5; 325 MG/1; MG/1
2 TABLET ORAL AS NEEDED
Status: COMPLETED | OUTPATIENT
Start: 2018-10-25 | End: 2018-10-25

## 2018-10-25 RX ORDER — ACETAMINOPHEN 500 MG
1000 TABLET ORAL ONCE
Status: DISCONTINUED | OUTPATIENT
Start: 2018-10-25 | End: 2018-10-25 | Stop reason: HOSPADM

## 2018-10-25 RX ORDER — SODIUM CHLORIDE, SODIUM LACTATE, POTASSIUM CHLORIDE, CALCIUM CHLORIDE 600; 310; 30; 20 MG/100ML; MG/100ML; MG/100ML; MG/100ML
INJECTION, SOLUTION INTRAVENOUS CONTINUOUS
Status: DISCONTINUED | OUTPATIENT
Start: 2018-10-25 | End: 2018-10-25

## 2018-10-25 RX ORDER — DIPHENHYDRAMINE HYDROCHLORIDE 50 MG/ML
12.5 INJECTION INTRAMUSCULAR; INTRAVENOUS AS NEEDED
Status: DISCONTINUED | OUTPATIENT
Start: 2018-10-25 | End: 2018-10-25

## 2018-10-25 RX ORDER — HEPARIN SODIUM 5000 [USP'U]/ML
INJECTION, SOLUTION INTRAVENOUS; SUBCUTANEOUS
Status: COMPLETED
Start: 2018-10-25 | End: 2018-10-25

## 2018-10-25 RX ORDER — HYDROCODONE BITARTRATE AND ACETAMINOPHEN 5; 325 MG/1; MG/1
1-2 TABLET ORAL EVERY 4 HOURS PRN
Qty: 40 TABLET | Refills: 0 | Status: SHIPPED | OUTPATIENT
Start: 2018-10-25 | End: 2018-11-02 | Stop reason: ALTCHOICE

## 2018-10-25 NOTE — H&P
Dr. Musa Chicas H&P/surgical clearance from 10/17/18 was reviewed today. No interval changes. Risks and benefits have been discussed. Informed consent has been obtained. She wishes to proceed as planned.      She has previously undergone bilateral EMPERATRIZ free f

## 2018-10-25 NOTE — BRIEF OP NOTE
Pre-Operative Diagnosis: Absence of both breasts [Z90.13]     Post-Operative Diagnosis: Absence of both breasts      Procedure Performed:   Procedure(s):  Revision of bilateral reconstructed breasts with reduction, excision of excess skin of bilateral flan

## 2018-10-25 NOTE — ANESTHESIA POSTPROCEDURE EVALUATION
[id number] [facility] Patient Status:  Hospital Outpatient Surgery   Age/Gender 50year old female MRN [id number]   Location [address] [facility] Attending Malcolm Velasquez MD   Hosp Day # 0 PCP Gracy Morales MD

## 2018-10-25 NOTE — ANESTHESIA PREPROCEDURE EVALUATION
PRE-OP EVALUATION    Patient Name: Lorelei Montoya    Pre-op Diagnosis: Absence of both breasts [Z90.13]    Procedure(s):  Revision of bilateral reconstructed breasts with reduction, excision of excess skin of bilateral flanks    Surgeon(s) and Role: BREAST SURGERY Bilateral 2017    Lateral capsulorrhaphy and Paz pattern skin excision, 22 modifier   •  DELIVERY ONLY     • HYSTERECTOMY      Total abdominal hysterectomy with salpingectomy   • HYSTEROSCOPY     • INSERT TISSUE EXPANDER(S) Bi including sore throat, hoarse voice, dental trauma, N/V.  Will position arms before induction of GA in effort to minimize post op numbness again  Plan/risks discussed with: patient and spouse                Present on Admission:  **None**

## 2018-10-26 NOTE — OPERATIVE REPORT
[facility]    PATIENT'S NAME: Dmitry SOLO   ATTENDING PHYSICIAN: Tete Rao MD   OPERATING PHYSICIAN: Tete Rao MD   PATIENT ACCOUNT#:   [id number]    LOCATION:  [address]  MEDICAL RECORD #:   [id number]       DA discussed.   Risks and complications include, but not limited to, infection, bleeding, scarring, damage to surrounding structures, mastectomy skin flap necrosis, flap necrosis, hematoma, seroma, wound dehiscence, residual asymmetry, and need for further shira to surgery and now the markings were confirmed. A 10 blade was used to excise the skin and electrocautery was used to excise the subcutaneous fat with it. Hemostasis was assured.   Then, a complex layered closure was performed using 0 Vicryl suture for th Then, a portion of the lateral flap was marked and excised. Inferomedially tangential excision of part of the flap was done as well. The total amount removed on the right side was 382 g.   Additional mastectomy skin flap tissue was removed to further tail

## 2018-11-01 ENCOUNTER — TELEPHONE (OUTPATIENT)
Dept: SURGERY | Facility: CLINIC | Age: 48
End: 2018-11-01

## 2018-11-01 NOTE — TELEPHONE ENCOUNTER
Pt phoned in concerned about bleeding she is having after her surgery last week. She reports that the left drain, output is decreasing and getting lighter in color, but the right continues to be dark blood.   Today she woke up to blood dripping down her s

## 2018-11-01 NOTE — TELEPHONE ENCOUNTER
Spoke to patient regarding phone call she previously had with TYRA Scott Spearing regarding bruising and blood coming from TANNA drain. Dr. Matt Pac was notified. Per Dr. Matt Pac, she would like patient to keep wearing her bra and to keep stripping the drain.  P

## 2018-11-02 ENCOUNTER — HOSPITAL ENCOUNTER (OUTPATIENT)
Facility: HOSPITAL | Age: 48
Setting detail: HOSPITAL OUTPATIENT SURGERY
Discharge: HOME | End: 2018-11-02
Attending: SURGERY | Admitting: SURGERY
Payer: COMMERCIAL

## 2018-11-02 ENCOUNTER — ANESTHESIA EVENT (OUTPATIENT)
Dept: SURGERY | Facility: HOSPITAL | Age: 48
End: 2018-11-02
Payer: COMMERCIAL

## 2018-11-02 ENCOUNTER — ANESTHESIA (OUTPATIENT)
Dept: SURGERY | Facility: HOSPITAL | Age: 48
End: 2018-11-02
Payer: COMMERCIAL

## 2018-11-02 ENCOUNTER — OFFICE VISIT (OUTPATIENT)
Dept: SURGERY | Facility: CLINIC | Age: 48
End: 2018-11-02
Payer: COMMERCIAL

## 2018-11-02 VITALS
DIASTOLIC BLOOD PRESSURE: 78 MMHG | WEIGHT: 269.69 LBS | TEMPERATURE: 98 F | BODY MASS INDEX: 37.76 KG/M2 | SYSTOLIC BLOOD PRESSURE: 139 MMHG | OXYGEN SATURATION: 94 % | RESPIRATION RATE: 16 BRPM | HEIGHT: 71 IN | HEART RATE: 89 BPM

## 2018-11-02 DIAGNOSIS — Z90.13 ABSENCE OF BOTH BREASTS: Primary | ICD-10-CM

## 2018-11-02 PROCEDURE — 0JC60ZZ EXTIRPATION OF MATTER FROM CHEST SUBCUTANEOUS TISSUE AND FASCIA, OPEN APPROACH: ICD-10-PCS | Performed by: SURGERY

## 2018-11-02 PROCEDURE — 0HQTXZZ REPAIR RIGHT BREAST, EXTERNAL APPROACH: ICD-10-PCS | Performed by: SURGERY

## 2018-11-02 PROCEDURE — 99024 POSTOP FOLLOW-UP VISIT: CPT | Performed by: SURGERY

## 2018-11-02 RX ORDER — MORPHINE SULFATE 10 MG/ML
6 INJECTION, SOLUTION INTRAMUSCULAR; INTRAVENOUS EVERY 10 MIN PRN
Status: DISCONTINUED | OUTPATIENT
Start: 2018-11-02 | End: 2018-11-02

## 2018-11-02 RX ORDER — MIDAZOLAM HYDROCHLORIDE 1 MG/ML
INJECTION INTRAMUSCULAR; INTRAVENOUS AS NEEDED
Status: DISCONTINUED | OUTPATIENT
Start: 2018-11-02 | End: 2018-11-02 | Stop reason: SURG

## 2018-11-02 RX ORDER — METOCLOPRAMIDE 10 MG/1
10 TABLET ORAL ONCE
Status: COMPLETED | OUTPATIENT
Start: 2018-11-02 | End: 2018-11-02

## 2018-11-02 RX ORDER — HYDROCODONE BITARTRATE AND ACETAMINOPHEN 5; 325 MG/1; MG/1
1 TABLET ORAL AS NEEDED
Status: COMPLETED | OUTPATIENT
Start: 2018-11-02 | End: 2018-11-02

## 2018-11-02 RX ORDER — METOCLOPRAMIDE HYDROCHLORIDE 5 MG/ML
INJECTION INTRAMUSCULAR; INTRAVENOUS AS NEEDED
Status: DISCONTINUED | OUTPATIENT
Start: 2018-11-02 | End: 2018-11-02 | Stop reason: SURG

## 2018-11-02 RX ORDER — MORPHINE SULFATE 4 MG/ML
2 INJECTION, SOLUTION INTRAMUSCULAR; INTRAVENOUS EVERY 10 MIN PRN
Status: DISCONTINUED | OUTPATIENT
Start: 2018-11-02 | End: 2018-11-02

## 2018-11-02 RX ORDER — CEFAZOLIN SODIUM/WATER 2 G/20 ML
SYRINGE (ML) INTRAVENOUS AS NEEDED
Status: DISCONTINUED | OUTPATIENT
Start: 2018-11-02 | End: 2018-11-02 | Stop reason: SURG

## 2018-11-02 RX ORDER — DEXAMETHASONE SODIUM PHOSPHATE 4 MG/ML
VIAL (ML) INJECTION AS NEEDED
Status: DISCONTINUED | OUTPATIENT
Start: 2018-11-02 | End: 2018-11-02 | Stop reason: SURG

## 2018-11-02 RX ORDER — HYDROCODONE BITARTRATE AND ACETAMINOPHEN 5; 325 MG/1; MG/1
2 TABLET ORAL AS NEEDED
Status: COMPLETED | OUTPATIENT
Start: 2018-11-02 | End: 2018-11-02

## 2018-11-02 RX ORDER — SODIUM CHLORIDE, SODIUM LACTATE, POTASSIUM CHLORIDE, CALCIUM CHLORIDE 600; 310; 30; 20 MG/100ML; MG/100ML; MG/100ML; MG/100ML
INJECTION, SOLUTION INTRAVENOUS CONTINUOUS
Status: DISCONTINUED | OUTPATIENT
Start: 2018-11-02 | End: 2018-11-02

## 2018-11-02 RX ORDER — ACETAMINOPHEN 500 MG
1000 TABLET ORAL ONCE
Status: COMPLETED | OUTPATIENT
Start: 2018-11-02 | End: 2018-11-02

## 2018-11-02 RX ORDER — BUPIVACAINE HYDROCHLORIDE 5 MG/ML
INJECTION, SOLUTION EPIDURAL; INTRACAUDAL AS NEEDED
Status: DISCONTINUED | OUTPATIENT
Start: 2018-11-02 | End: 2018-11-02

## 2018-11-02 RX ORDER — FAMOTIDINE 20 MG/1
20 TABLET ORAL ONCE
Status: COMPLETED | OUTPATIENT
Start: 2018-11-02 | End: 2018-11-02

## 2018-11-02 RX ORDER — ONDANSETRON 2 MG/ML
4 INJECTION INTRAMUSCULAR; INTRAVENOUS ONCE AS NEEDED
Status: COMPLETED | OUTPATIENT
Start: 2018-11-02 | End: 2018-11-02

## 2018-11-02 RX ORDER — CEFAZOLIN SODIUM/WATER 2 G/20 ML
2 SYRINGE (ML) INTRAVENOUS ONCE
Status: DISCONTINUED | OUTPATIENT
Start: 2018-11-02 | End: 2018-11-02 | Stop reason: HOSPADM

## 2018-11-02 RX ORDER — MORPHINE SULFATE 4 MG/ML
4 INJECTION, SOLUTION INTRAMUSCULAR; INTRAVENOUS EVERY 10 MIN PRN
Status: DISCONTINUED | OUTPATIENT
Start: 2018-11-02 | End: 2018-11-02

## 2018-11-02 RX ORDER — NALOXONE HYDROCHLORIDE 0.4 MG/ML
80 INJECTION, SOLUTION INTRAMUSCULAR; INTRAVENOUS; SUBCUTANEOUS AS NEEDED
Status: DISCONTINUED | OUTPATIENT
Start: 2018-11-02 | End: 2018-11-02

## 2018-11-02 RX ADMIN — METOCLOPRAMIDE HYDROCHLORIDE 10 MG: 5 INJECTION INTRAMUSCULAR; INTRAVENOUS at 14:32:00

## 2018-11-02 RX ADMIN — SODIUM CHLORIDE, SODIUM LACTATE, POTASSIUM CHLORIDE, CALCIUM CHLORIDE: 600; 310; 30; 20 INJECTION, SOLUTION INTRAVENOUS at 16:30:00

## 2018-11-02 RX ADMIN — CEFAZOLIN SODIUM/WATER 2 G: 2 G/20 ML SYRINGE (ML) INTRAVENOUS at 14:40:00

## 2018-11-02 RX ADMIN — SODIUM CHLORIDE, SODIUM LACTATE, POTASSIUM CHLORIDE, CALCIUM CHLORIDE: 600; 310; 30; 20 INJECTION, SOLUTION INTRAVENOUS at 15:36:00

## 2018-11-02 RX ADMIN — DEXAMETHASONE SODIUM PHOSPHATE 4 MG: 4 MG/ML VIAL (ML) INJECTION at 14:32:00

## 2018-11-02 RX ADMIN — MIDAZOLAM HYDROCHLORIDE 2 MG: 1 INJECTION INTRAMUSCULAR; INTRAVENOUS at 14:32:00

## 2018-11-02 NOTE — OPERATIVE REPORT
[facility]    PATIENT'S NAME: Marcy Lopez GERMANIA   ATTENDING PHYSICIAN: Tete Rodas MD   OPERATING PHYSICIAN: Tete Rodas MD   PATIENT ACCOUNT#:   [id number]    LOCATION:  [address]  MEDICAL RECORD #:   [id number]       DATE Agness Mort endotracheal anesthesia was administered. Perioperative antibiotics were given. The entire breast and chest were prepped and draped in the usual sterile fashion.   The procedure was started with opening the incision on the right breast.  The entire infram

## 2018-11-02 NOTE — ANESTHESIA PROCEDURE NOTES
ANESTHESIA INTUBATION  Date/Time: 11/2/2018 2:35 PM  Urgency: elective    Airway not difficult    General Information and Staff    Patient location during procedure: OR  Anesthesiologist: Catalina Musa MD  Performed: anesthesiologist     Indications an

## 2018-11-02 NOTE — CONSULTS
Estabilshed Patient Consultation    Chief Complaint: R breast hematoma    History of Present Illness:   Olivia Hodges is a 50year old female who returns to the office R breast hematoma after BL breast free flap reduction/ revision      Past Medical tissue expander, left breast, Extensive capsulectomy, left breast   • OTHER SURGICAL HISTORY  03/12/2018    Total capsulectomy right breast, Removal of right breast tissue expander, Delayed right breast reconstruction with EMPERATRIZ flap   • TONSILLECTOMY breast hematoma    Discussion and Plan:  The patient was counseled on the different treatment options. We discussed monitoring vs wash out, pt and  prefer washout. I explained increased risk of healing delay.    She was added on for the earliest

## 2018-11-02 NOTE — OR PREOP
Patient with increasing throbbing pain to underside of right breast. Offered ice pack but declined. Offered Norco which patient accepted but requested Zofran to be given at the same time.  Will give medication than do discharge instructions when pain decrea

## 2018-11-02 NOTE — ANESTHESIA PREPROCEDURE EVALUATION
Anesthesia PreOp Note    HPI:     All Tejada is a 50year old female who presents for preoperative consultation requested by: Whitney Wilson MD    Date of Surgery: 11/2/2018    Procedure(s):  IRRIGATION & DEBRIDEMENT  Indication: Posttraumatic h EXPANDER(S) Bilateral 07/31/2017    133 MX-16-T 850 mL tissue expander    • MASTECTOMY LEFT Left 07/31/2017   • MASTECTOMY RIGHT Right 07/31/2017   • MICRO FLAP Bilateral 3/12/2018    Performed by Saroj Huizar MD at [address] [place] [address] OR   • MICRO FLAP Bilateral 3 Quit date: 10/11/2012        Years since quittin.0      Smokeless tobacco: Former User    Substance and Sexual Activity      Alcohol use: No        Alcohol/week: 0.0 oz      Drug use: No      Sexual activity: Not on file    Other Topics      Concerns: or family member of the nature of the anesthetic plan, benefits, risks including possible dental damage if relevant, major complications, and any alternative forms of anesthetic management.    All of the patient's questions were answered to the best of my a

## 2018-11-02 NOTE — BRIEF OP NOTE
Pre-Operative Diagnosis:  hematoma of right breast [S20.01XA]     Post-Operative Diagnosis:  hematoma of right breast [S20.01XA]      Procedure Performed:   Procedure(s):  EVACUATION OF RIGHT BREAST HEMATOMA  Wound closure R breast 45cm    Surgeon(s) and R

## 2018-11-09 ENCOUNTER — OFFICE VISIT (OUTPATIENT)
Dept: SURGERY | Facility: CLINIC | Age: 48
End: 2018-11-09
Payer: COMMERCIAL

## 2018-11-09 DIAGNOSIS — Z90.13 ABSENCE OF BOTH BREASTS: Primary | ICD-10-CM

## 2018-11-09 PROCEDURE — 99024 POSTOP FOLLOW-UP VISIT: CPT | Performed by: PHYSICIAN ASSISTANT

## 2018-11-09 NOTE — PROGRESS NOTES
This is a 44-year-old female who underwent prophylactic mastectomy followed by eventual EMPERATRIZ free flap reconstruction in March. She then presented for reduction of her reconstructed breasts and symmetrization and this procedure was done on 10/25/18.  She s antibiotic. He no longer needs pain medications. We will see her next week for additional evaluation. All of her questions were asked and answered. She is happy with the revision procedure and feels that she has good symmetry bilaterally.

## 2018-11-16 ENCOUNTER — OFFICE VISIT (OUTPATIENT)
Dept: SURGERY | Facility: CLINIC | Age: 48
End: 2018-11-16
Payer: COMMERCIAL

## 2018-11-16 VITALS — BODY MASS INDEX: 38.05 KG/M2 | WEIGHT: 271.81 LBS | HEIGHT: 71 IN

## 2018-11-16 DIAGNOSIS — Z90.13 ABSENCE OF BOTH BREASTS: Primary | ICD-10-CM

## 2018-11-16 PROCEDURE — 99024 POSTOP FOLLOW-UP VISIT: CPT | Performed by: SURGERY

## 2018-11-16 NOTE — PROGRESS NOTES
Lexi Elam is a 50year old female who presents today for a follow-up after revision of both reconstructed breasts EMPERATRIZ free flaps. And evacuation of R breast hematoma. She denies fever and chills.  She denies nausea, vomiting, diarrhea or const

## 2019-02-27 ENCOUNTER — OFFICE VISIT (OUTPATIENT)
Dept: SURGERY | Facility: CLINIC | Age: 49
End: 2019-02-27
Payer: COMMERCIAL

## 2019-02-27 DIAGNOSIS — Z90.13 ABSENCE OF BOTH BREASTS: Primary | ICD-10-CM

## 2019-02-27 PROCEDURE — 99213 OFFICE O/P EST LOW 20 MIN: CPT | Performed by: SURGERY

## 2019-02-27 NOTE — CONSULTS
Estabilshed Patient Consultation    Chief Complaint: absence of both breasts  History of Present Illness:   Cierra Johnston is a 52year old female who returns to the office after BL mastectomy, wise excision TE placement, eventual EMPERATRIZ free flap BL a HISTORY  03/12/2018    Left breast deep inferior epigastric  free flap reconstruction from right hemiabdomen, Removal of tissue expander, left breast, Extensive capsulectomy, left breast   • OTHER SURGICAL HISTORY  03/12/2018    Total capsulectom appreciated.     Musculoskeletal: Extremities unremarkable, without edema, tenderness or deformities        Impression:   Gabe Echevarria  is a 52year old female after BL EMPERATRIZ free flaps    Discussion and Plan:  The patient was counseled on the differ

## 2019-04-09 ENCOUNTER — NURSE ONLY (OUTPATIENT)
Dept: SURGERY | Facility: CLINIC | Age: 49
End: 2019-04-09

## 2019-04-09 NOTE — PROGRESS NOTES
The following was sent via secure office email to the patient as requested:    10/25/2018 OP report with Dr. Aide Nunez  11/2/2018 OP report with Dr. Krysta Hall  information

## 2019-04-24 ENCOUNTER — OFFICE VISIT (OUTPATIENT)
Dept: FAMILY MEDICINE CLINIC | Facility: CLINIC | Age: 49
End: 2019-04-24
Payer: COMMERCIAL

## 2019-04-24 ENCOUNTER — HOSPITAL ENCOUNTER (OUTPATIENT)
Dept: GENERAL RADIOLOGY | Age: 49
Discharge: HOME | End: 2019-04-24
Attending: FAMILY MEDICINE
Payer: COMMERCIAL

## 2019-04-24 VITALS
SYSTOLIC BLOOD PRESSURE: 132 MMHG | HEART RATE: 86 BPM | DIASTOLIC BLOOD PRESSURE: 88 MMHG | BODY MASS INDEX: 41 KG/M2 | TEMPERATURE: 99 F | WEIGHT: 290.63 LBS | RESPIRATION RATE: 12 BRPM | OXYGEN SATURATION: 98 %

## 2019-04-24 DIAGNOSIS — R63.5 WEIGHT GAIN: ICD-10-CM

## 2019-04-24 DIAGNOSIS — M79.671 RIGHT FOOT PAIN: ICD-10-CM

## 2019-04-24 DIAGNOSIS — M79.671 RIGHT FOOT PAIN: Primary | ICD-10-CM

## 2019-04-24 PROBLEM — L03.115 CELLULITIS OF RIGHT LOWER EXTREMITY: Status: RESOLVED | Noted: 2018-09-20 | Resolved: 2019-04-24

## 2019-04-24 PROCEDURE — 73630 X-RAY EXAM OF FOOT: CPT | Performed by: FAMILY MEDICINE

## 2019-04-24 PROCEDURE — 99214 OFFICE O/P EST MOD 30 MIN: CPT | Performed by: FAMILY MEDICINE

## 2019-04-24 PROCEDURE — 84439 ASSAY OF FREE THYROXINE: CPT | Performed by: FAMILY MEDICINE

## 2019-04-24 PROCEDURE — 36415 COLL VENOUS BLD VENIPUNCTURE: CPT | Performed by: FAMILY MEDICINE

## 2019-04-24 PROCEDURE — 84443 ASSAY THYROID STIM HORMONE: CPT | Performed by: FAMILY MEDICINE

## 2019-04-24 NOTE — PROGRESS NOTES
Mirna Gonzalez is a 52year old female.     CC:  Patient presents with:  Weight Loss: per pt  Foot Pain: right      HPI:  The patient has noted musculoskeletal pain:  Location: right foot  Duration: 3 week(s)  Injury: none  Other joints similarly af HYSTEROSCOPY     • INSERT TISSUE EXPANDER(S) Bilateral 07/31/2017    133 MX-16-T 850 mL tissue expander    • IRRIGATION & DEBRIDEMENT Right 11/2/2018    Performed by Ana Lilia Lay MD at [facility]   • MASTECTOMY LEFT Left 07/31/2017   • MASTECTOMY RIGHT developed, well nourished, in no apparent distress  EYE: B conjunctiva and lids normal  HENT: No oral lesions.    NECK: No lymphadenopathy, thyromegaly or masses  CAR: S1, S2 normal, RRR; no S3, no S4; no click; murmur negative  PULM: clear to auscultation prescribed then close monitoring is in order.       Orders for this visit:  Orders Placed This Encounter      TSH and Free T4 [E]      Orders Placed This Encounter      TSH and Free T4 [E]          Standing Status: Future          Number of Occurrences: 1

## 2019-04-25 ENCOUNTER — TELEPHONE (OUTPATIENT)
Dept: FAMILY MEDICINE CLINIC | Facility: CLINIC | Age: 49
End: 2019-04-25

## 2019-04-25 RX ORDER — PHENTERMINE HYDROCHLORIDE 37.5 MG/1
37.5 CAPSULE ORAL EVERY MORNING
Qty: 30 CAPSULE | Refills: 0 | Status: SHIPPED
Start: 2019-04-25 | End: 2019-06-07

## 2019-04-25 NOTE — TELEPHONE ENCOUNTER
Order ready to fax. Yes, the taking of the left over Adipex likely caused a bit of the BP elevation.   Thanks

## 2019-04-25 NOTE — TELEPHONE ENCOUNTER
Order faxed. Patient advised at phone call that the order would be sent. She would be contacted if there where any additional questions.

## 2019-04-25 NOTE — TELEPHONE ENCOUNTER
----- Message from Christie Henson MD sent at 4/25/2019  3:04 AM CDT -----  Please let patient know or leave message that her thyroid tests are fine. Her last BP in office was fine. Does she want to try Adipex again to aid in weight loss?

## 2019-04-25 NOTE — TELEPHONE ENCOUNTER
Patient advised of the blood work results. She would like a prescription for the Phentermine ([facility])  Patient did want to let you know that she did have a couple of the Phentermine left and did take one yesterday.  She was thinking t

## 2019-05-20 ENCOUNTER — TELEPHONE (OUTPATIENT)
Dept: FAMILY MEDICINE CLINIC | Facility: CLINIC | Age: 49
End: 2019-05-20

## 2019-05-20 ENCOUNTER — OFFICE VISIT (OUTPATIENT)
Dept: FAMILY MEDICINE CLINIC | Facility: CLINIC | Age: 49
End: 2019-05-20
Payer: COMMERCIAL

## 2019-05-20 VITALS
DIASTOLIC BLOOD PRESSURE: 110 MMHG | BODY MASS INDEX: 40.97 KG/M2 | TEMPERATURE: 98 F | SYSTOLIC BLOOD PRESSURE: 134 MMHG | RESPIRATION RATE: 14 BRPM | HEIGHT: 69.5 IN | WEIGHT: 283 LBS | HEART RATE: 86 BPM

## 2019-05-20 DIAGNOSIS — R03.0 ELEVATED BP WITHOUT DIAGNOSIS OF HYPERTENSION: ICD-10-CM

## 2019-05-20 DIAGNOSIS — J36 PERITONSILLAR CELLULITIS: Primary | ICD-10-CM

## 2019-05-20 PROCEDURE — 99214 OFFICE O/P EST MOD 30 MIN: CPT | Performed by: FAMILY MEDICINE

## 2019-05-20 RX ORDER — AMOXICILLIN AND CLAVULANATE POTASSIUM 500; 125 MG/1; MG/1
TABLET, FILM COATED ORAL
Qty: 20 TABLET | Refills: 0 | Status: SHIPPED | OUTPATIENT
Start: 2019-05-20 | End: 2019-05-30

## 2019-05-20 RX ORDER — HYDROCODONE BITARTRATE AND ACETAMINOPHEN 5; 325 MG/1; MG/1
1 TABLET ORAL EVERY 8 HOURS PRN
Qty: 12 TABLET | Refills: 0 | Status: SHIPPED | OUTPATIENT
Start: 2019-05-20 | End: 2019-06-07

## 2019-05-20 NOTE — PROGRESS NOTES
Carlo Singh is a 52year old female. CC:  Patient presents with:  Sore Throat      HPI:  The patient has primary complaint of L sided throat pain  for  3 days. Associated symptoms include lower energy. The patient has not taken temperatures. IRRIGATION & DEBRIDEMENT Right 11/2/2018    Performed by Genaro Bobo MD at [facility] MAIN OR   • MASTECTOMY LEFT Left 07/31/2017   • MASTECTOMY RIGHT Right 07/31/2017   • MICRO FLAP Bilateral 3/12/2018    Performed by Genaro Bobo MD at [facility] MAIN OR   • DAPHNEY tympanic membranes are normal.   NECK: large L ant LAD, no thyromegaly, no thyroid masses   CAR: S1, S2 normal, RRR; no S3, no S4; no click; murmur negative  PULM: clear to auscultation B, no accessory muscle use  GI: not examined  PSYCH: alert and oriente

## 2019-05-20 NOTE — TELEPHONE ENCOUNTER
Patient notified and verbalized understanding.      Future Appointments   Date Time Provider Familia Burns   5/20/2019 10:30 AM Jearlean Lefort, MD [facility] Pablo Vo   2/26/2020  3:15 PM Ruddy Rene MD [id number] Surg[facility]Onc

## 2019-05-20 NOTE — TELEPHONE ENCOUNTER
PT CALLED TO ADV THAT PT HAS L SIDE THROAT PAIN.     PT GOING OUT OF TOWN TOMORROW AND WOULD LIKE TO BE SEEN, LOOKING FOR RECOMMENDATIONS    PHARMACY JES[facility]S Bains

## 2019-06-07 ENCOUNTER — OFFICE VISIT (OUTPATIENT)
Dept: FAMILY MEDICINE CLINIC | Facility: CLINIC | Age: 49
End: 2019-06-07
Payer: COMMERCIAL

## 2019-06-07 VITALS
RESPIRATION RATE: 14 BRPM | SYSTOLIC BLOOD PRESSURE: 132 MMHG | TEMPERATURE: 98 F | DIASTOLIC BLOOD PRESSURE: 84 MMHG | BODY MASS INDEX: 42 KG/M2 | OXYGEN SATURATION: 100 % | WEIGHT: 286.63 LBS | HEART RATE: 84 BPM

## 2019-06-07 DIAGNOSIS — R19.5 LOOSE STOOLS: ICD-10-CM

## 2019-06-07 DIAGNOSIS — K92.1 HEMATOCHEZIA: Primary | ICD-10-CM

## 2019-06-07 PROCEDURE — 99214 OFFICE O/P EST MOD 30 MIN: CPT | Performed by: FAMILY MEDICINE

## 2019-06-07 NOTE — PROGRESS NOTES
Gabe Echevarria is a 52year old female.     CC:  Patient presents with:  Blood In Stool: per pt- since monday, cramping    [address] [facility], Chaitanya Taylor and Josh   Tel: [phone]      HPI:  Has had some blood in the stool a Performed by Malcolm Velasquez MD at [facility] MAIN OR   • MICRO FLAP Bilateral 3/12/2018    Performed by Kalli Lockett MD at [facility] MAIN OR   • OTHER      Exploratory Lap   • OTHER SURGICAL HISTORY  03/12/2018    Left breast deep inferior epigastric  beata and oriented x 3; affect appropriate  SKIN: not examined  BREAST: not examined/not applicable  EXTREMITIES: No clubbing, cyanosis or edema  RECTAL: not examined  GENITAL: not examined  LYMPH: no supraclavicular nodes  MUSCULOSKELETAL: normal ambulation  NE

## 2019-06-11 ENCOUNTER — APPOINTMENT (OUTPATIENT)
Dept: LAB | Age: 49
End: 2019-06-11
Attending: FAMILY MEDICINE
Payer: COMMERCIAL

## 2019-06-13 ENCOUNTER — LAB ENCOUNTER (OUTPATIENT)
Dept: LAB | Age: 49
End: 2019-06-13
Attending: FAMILY MEDICINE
Payer: COMMERCIAL

## 2019-06-13 ENCOUNTER — TELEPHONE (OUTPATIENT)
Dept: FAMILY MEDICINE CLINIC | Facility: CLINIC | Age: 49
End: 2019-06-13

## 2019-06-13 DIAGNOSIS — R92.1 MAMMOGRAPHIC CALCIFICATION: Primary | ICD-10-CM

## 2019-06-13 DIAGNOSIS — R19.5 ABNORMAL FECES: ICD-10-CM

## 2019-06-13 PROCEDURE — 87507 IADNA-DNA/RNA PROBE TQ 12-25: CPT | Performed by: FAMILY MEDICINE

## 2019-06-13 NOTE — TELEPHONE ENCOUNTER
Spoke with Heena @ the lab, states they can run the test after they receive an accountability form. She is faxing it.

## 2019-06-13 NOTE — TELEPHONE ENCOUNTER
Apparently she dropped off a stool specimen at the [facility]. The lab received the specimen without a , therefore it needs to be recollected. The specimen was received by the lab .

## 2019-06-24 ENCOUNTER — OFFICE VISIT (OUTPATIENT)
Dept: FAMILY MEDICINE CLINIC | Facility: CLINIC | Age: 49
End: 2019-06-24
Payer: COMMERCIAL

## 2019-06-24 ENCOUNTER — HOSPITAL ENCOUNTER (OUTPATIENT)
Dept: GENERAL RADIOLOGY | Age: 49
Discharge: HOME | End: 2019-06-24
Attending: FAMILY MEDICINE
Payer: COMMERCIAL

## 2019-06-24 VITALS
BODY MASS INDEX: 40 KG/M2 | SYSTOLIC BLOOD PRESSURE: 128 MMHG | RESPIRATION RATE: 14 BRPM | DIASTOLIC BLOOD PRESSURE: 88 MMHG | TEMPERATURE: 99 F | HEART RATE: 80 BPM | WEIGHT: 289 LBS

## 2019-06-24 DIAGNOSIS — M79.671 RIGHT FOOT PAIN: ICD-10-CM

## 2019-06-24 DIAGNOSIS — S92.514A CLOSED NONDISPLACED FRACTURE OF PROXIMAL PHALANX OF LESSER TOE OF RIGHT FOOT, INITIAL ENCOUNTER: Primary | ICD-10-CM

## 2019-06-24 PROBLEM — Z90.13 ABSENCE OF BOTH BREASTS: Status: RESOLVED | Noted: 2017-07-31 | Resolved: 2019-06-24

## 2019-06-24 PROCEDURE — 73630 X-RAY EXAM OF FOOT: CPT | Performed by: FAMILY MEDICINE

## 2019-06-24 PROCEDURE — 99214 OFFICE O/P EST MOD 30 MIN: CPT | Performed by: FAMILY MEDICINE

## 2019-06-24 RX ORDER — ACETAMINOPHEN AND CODEINE PHOSPHATE 300; 30 MG/1; MG/1
1 TABLET ORAL EVERY 6 HOURS PRN
Qty: 30 TABLET | Refills: 0 | Status: SHIPPED
Start: 2019-06-24 | End: 2020-10-29

## 2019-06-24 NOTE — PROGRESS NOTES
Taylor Iqbal is a 52year old female. CC:  Patient presents with: Toe Injury: R baby toe per pt       HPI:  R foot pain. Stubbed the 5th toe into a [employer] Flex machine yesterday. Now with increasing pain, bruising and swelling.    Allergies:  No Know SURGICAL HISTORY  03/12/2018    Left breast deep inferior epigastric  free flap reconstruction from right hemiabdomen, Removal of tissue expander, left breast, Extensive capsulectomy, left breast   • OTHER SURGICAL HISTORY  03/12/2018    Total ca fracture at the distal 5th prox phalanx extending into the joint space    ASSESSMENT AND PLAN    1. Closed nondisplaced fracture of proximal phalanx of lesser toe of right foot, initial encounter  [address]   T#3 for pain.       Order

## 2019-07-24 PROCEDURE — 88305 TISSUE EXAM BY PATHOLOGIST: CPT | Performed by: INTERNAL MEDICINE

## 2020-07-06 ENCOUNTER — TELEPHONE (OUTPATIENT)
Dept: FAMILY MEDICINE CLINIC | Facility: CLINIC | Age: 50
End: 2020-07-06

## 2020-07-06 DIAGNOSIS — Z20.822 CLOSE EXPOSURE TO COVID-19 VIRUS: Primary | ICD-10-CM

## 2020-07-06 NOTE — TELEPHONE ENCOUNTER
Pt advised of the recommmendations and advised that Richard Montana should call her to schedule the testing. She v/u  Advised that if she doesn't meet criteria then she may have [facility]  She v/u      Also advised to wear a mask and do goo

## 2020-07-06 NOTE — TELEPHONE ENCOUNTER
I think it's reasonable to test given the exposure last Thursday. I placed order, will see if alexandre can test, if not can go to [address].   PLEASE be good about wearing mask and hand hygeine in the meantime

## 2020-07-06 NOTE — TELEPHONE ENCOUNTER
Pt states she her last contact with  was Thursday of last week. She said  walked by her desk and they spoke. Neither was masked. Pt was advised that the  was positive last night .       was tested on 6/27 and work

## 2020-07-06 NOTE — TELEPHONE ENCOUNTER
She received a call from work letting her know a  at her work is positive for Covid 19  Does not have direct contact  Please advise

## 2020-07-07 ENCOUNTER — LAB ENCOUNTER (OUTPATIENT)
Dept: LAB | Facility: HOSPITAL | Age: 50
End: 2020-07-07
Attending: FAMILY MEDICINE
Payer: COMMERCIAL

## 2020-07-07 DIAGNOSIS — Z20.822 CLOSE EXPOSURE TO COVID-19 VIRUS: ICD-10-CM

## 2020-07-11 LAB — SARS-COV-2 BY PCR: NOT DETECTED

## 2020-07-13 ENCOUNTER — OFFICE VISIT (OUTPATIENT)
Dept: FAMILY MEDICINE CLINIC | Facility: CLINIC | Age: 50
End: 2020-07-13
Payer: COMMERCIAL

## 2020-07-13 VITALS
TEMPERATURE: 99 F | SYSTOLIC BLOOD PRESSURE: 132 MMHG | RESPIRATION RATE: 18 BRPM | HEART RATE: 87 BPM | OXYGEN SATURATION: 97 % | DIASTOLIC BLOOD PRESSURE: 72 MMHG | WEIGHT: 293 LBS | BODY MASS INDEX: 42 KG/M2

## 2020-07-13 DIAGNOSIS — S61.411A LACERATION OF RIGHT HAND WITHOUT FOREIGN BODY, INITIAL ENCOUNTER: Primary | ICD-10-CM

## 2020-07-13 PROCEDURE — 90471 IMMUNIZATION ADMIN: CPT | Performed by: FAMILY MEDICINE

## 2020-07-13 PROCEDURE — 99213 OFFICE O/P EST LOW 20 MIN: CPT | Performed by: FAMILY MEDICINE

## 2020-07-13 PROCEDURE — 90715 TDAP VACCINE 7 YRS/> IM: CPT | Performed by: FAMILY MEDICINE

## 2020-07-13 NOTE — PROGRESS NOTES
Tank Haque is a 48year old female.   HPI:   Anish Melendez cut her right hand while doing dishes, there was a glass in the sink that broke she cut it yesterday,about 24 hours ago, cannot recall her last Td, she is concerned there may be glass in it, the glas suspicious lesions  EXTREMITIES: there is a tear of the lateal aspect of the right MCP joint, partially approximated, could not appreciate any foreign body with palpation  ASSESSMENT AND PLAN:     Laceration of right hand without foreign body, initial enco

## 2020-08-19 ENCOUNTER — OFFICE VISIT (OUTPATIENT)
Dept: SURGERY | Facility: CLINIC | Age: 50
End: 2020-08-19
Payer: COMMERCIAL

## 2020-08-19 DIAGNOSIS — Z90.13 ABSENCE OF BOTH BREASTS: Primary | ICD-10-CM

## 2020-08-19 PROCEDURE — 99213 OFFICE O/P EST LOW 20 MIN: CPT | Performed by: SURGERY

## 2020-08-19 NOTE — CONSULTS
Estabilshed Patient Consultation    Chief Complaint: absence of both breasts    History of Present Illness:   Cj Mccurdy is a 52year old female who returns to the office after BL mastectomy, wise excision TE placement, eventual EMPERATRIZ free flap BL • OTHER      Exploratory Lap   • OTHER SURGICAL HISTORY  03/12/2018    Left breast deep inferior epigastric  free flap reconstruction from right hemiabdomen, Removal of tissue expander, left breast, Extensive capsulectomy, left breast   • OTHER supraclavicular, or axillary lymphadenopathy appreciated.     Musculoskeletal: Extremities unremarkable, without edema, tenderness or deformities           Impression:   Yunior Nicholas  is a 52year old female after BL EMPERATRIZ free flaps     Discussion

## 2020-10-15 ENCOUNTER — TELEPHONE (OUTPATIENT)
Dept: FAMILY MEDICINE CLINIC | Facility: CLINIC | Age: 50
End: 2020-10-15

## 2020-10-15 NOTE — TELEPHONE ENCOUNTER
I don't remove skin lesions anymore. I refer to Dr. Gabriel Zavaleta as he does a much better job then I do.   Thanks

## 2020-10-15 NOTE — TELEPHONE ENCOUNTER
Pt called to ask if Dr Grecia Mcadams could remove a mole the size of a pencil eraser located on her bra line.      Thank you

## 2020-10-15 NOTE — TELEPHONE ENCOUNTER
Patient advised of the information per Dr. Jodi Fang.  Patient provided with phone number for Dr. Jono Ny

## 2020-10-29 ENCOUNTER — OFFICE VISIT (OUTPATIENT)
Dept: SURGERY | Facility: CLINIC | Age: 50
End: 2020-10-29
Payer: COMMERCIAL

## 2020-10-29 VITALS
TEMPERATURE: 98 F | BODY MASS INDEX: 41.02 KG/M2 | WEIGHT: 293 LBS | HEART RATE: 79 BPM | DIASTOLIC BLOOD PRESSURE: 86 MMHG | HEIGHT: 71 IN | SYSTOLIC BLOOD PRESSURE: 153 MMHG

## 2020-10-29 DIAGNOSIS — L98.9 SKIN LESION: Primary | ICD-10-CM

## 2020-10-29 PROCEDURE — 99242 OFF/OP CONSLTJ NEW/EST SF 20: CPT | Performed by: SURGERY

## 2020-10-29 PROCEDURE — 3079F DIAST BP 80-89 MM HG: CPT | Performed by: SURGERY

## 2020-10-29 PROCEDURE — 99072 ADDL SUPL MATRL&STAF TM PHE: CPT | Performed by: SURGERY

## 2020-10-29 PROCEDURE — 3077F SYST BP >= 140 MM HG: CPT | Performed by: SURGERY

## 2020-10-29 PROCEDURE — 3008F BODY MASS INDEX DOCD: CPT | Performed by: SURGERY

## 2020-10-29 NOTE — H&P
New Patient Visit Note       Active Problems      1. Skin lesion        Chief Complaint   Patient presents with:  Lesion: New patient referred by Dr. Sakshi Martin for skin lesion. c/o mole on bra line. hx bilateral mastectomy and reconstuction.        Allergies Performed by Mark Story MD at [facility] MAIN OR   • OTHER      Exploratory Lap   • OTHER SURGICAL HISTORY  03/12/2018    Left breast deep inferior epigastric  free flap reconstruction from right hemiabdomen, Removal of tissue expander, left breast, Psychiatric/Behavioral: Negative for behavioral problems, confusion, hallucinations and sleep disturbance.        Physical Findings   /86   Pulse 79   Temp 98.2 °F (36.8 °C) (Temporal)   Ht 71\"   Wt (!) 301 lb (136.5 kg)   BMI 41.98 kg/m²   Physical Assessment   Skin lesion  (primary encounter diagnosis)      Plan     Patient would like to proceed with surgery-excision of pigmented lesion right upper quadrant  With local anesthesia  Surgery to be scheduled November 9, 2020             No orders of the

## 2020-10-29 NOTE — H&P (VIEW-ONLY)
New Patient Visit Note       Active Problems      1. Skin lesion        Chief Complaint   Patient presents with:  Lesion: New patient referred by Dr. Theresa Mcdaniel for skin lesion. c/o mole on bra line. hx bilateral mastectomy and reconstuction.        Allergies Performed by Jloly Tang MD at [facility] OR   • OTHER      Exploratory Lap   • OTHER SURGICAL HISTORY  03/12/2018    Left breast deep inferior epigastric  free flap reconstruction from right hemiabdomen, Removal of tissue expander, left breast, Psychiatric/Behavioral: Negative for behavioral problems, confusion, hallucinations and sleep disturbance.        Physical Findings   /86   Pulse 79   Temp 98.2 °F (36.8 °C) (Temporal)   Ht 71\"   Wt (!) 301 lb (136.5 kg)   BMI 41.98 kg/m²   Physical Assessment   Skin lesion  (primary encounter diagnosis)      Plan     Patient would like to proceed with surgery-excision of pigmented lesion right upper quadrant  With local anesthesia  Surgery to be scheduled November 9, 2020             No orders of the

## 2020-10-30 ENCOUNTER — TELEPHONE (OUTPATIENT)
Dept: SURGERY | Facility: CLINIC | Age: 50
End: 2020-10-30

## 2020-10-30 DIAGNOSIS — D22.9 BENIGN MOLE: Primary | ICD-10-CM
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
## 2020-11-06 ENCOUNTER — APPOINTMENT (OUTPATIENT)
Dept: LAB | Facility: HOSPITAL | Age: 50
End: 2020-11-06
Attending: SURGERY
Payer: COMMERCIAL

## 2020-11-06 DIAGNOSIS — D22.9 BENIGN MOLE: ICD-10-CM

## 2020-11-09 ENCOUNTER — HOSPITAL ENCOUNTER (OUTPATIENT)
Facility: HOSPITAL | Age: 50
Setting detail: HOSPITAL OUTPATIENT SURGERY
Discharge: HOME | End: 2020-11-09
Attending: SURGERY | Admitting: SURGERY
Payer: COMMERCIAL

## 2020-11-09 VITALS
RESPIRATION RATE: 18 BRPM | SYSTOLIC BLOOD PRESSURE: 144 MMHG | OXYGEN SATURATION: 100 % | HEART RATE: 78 BPM | DIASTOLIC BLOOD PRESSURE: 105 MMHG

## 2020-11-09 DIAGNOSIS — D22.9 BENIGN MOLE: Primary | ICD-10-CM

## 2020-11-09 PROCEDURE — 88305 TISSUE EXAM BY PATHOLOGIST: CPT | Performed by: SURGERY

## 2020-11-09 PROCEDURE — 0HB7XZZ EXCISION OF ABDOMEN SKIN, EXTERNAL APPROACH: ICD-10-PCS | Performed by: SURGERY

## 2020-11-09 RX ORDER — LIDOCAINE HYDROCHLORIDE AND EPINEPHRINE 10; 10 MG/ML; UG/ML
INJECTION, SOLUTION INFILTRATION; PERINEURAL AS NEEDED
Status: DISCONTINUED | OUTPATIENT
Start: 2020-11-09 | End: 2020-11-15

## 2020-11-09 NOTE — INTERVAL H&P NOTE
Pre-op Diagnosis: Benign mole [D22.9]    The above referenced H&P was reviewed by Bonnie Carrington MD on 11/9/2020, the patient was examined and no significant changes have occurred in the patient's condition since the H&P was performed.   I discussed with t

## 2020-11-10 NOTE — INTERVAL H&P NOTE
Pre-op Diagnosis: Benign mole [D22.9]    The above referenced H&P was reviewed by Quyen Jefferson MD on 11/9/2020, the patient was examined and no significant changes have occurred in the patient's condition since the H&P was performed.   I discussed with yessica

## 2020-11-10 NOTE — OPERATIVE REPORT
[facility]  Operative Note    Quilla Lesser Location: OR   CSN [id number] MRN [id number]   Admission Date 11/9/2020 Operation Date 11/9/2020   Attending Physician Moisés Patel MD Operating Physician Ac Walsh MD     Date of procedure: intraoperative and perioperative risks were discussed and the patient does not have any further questions at this time and wishes to proceed with surgery.  The patient identified the lesion, confirmed the location of the lesion and was marked in the preoper

## 2021-05-10 ENCOUNTER — TELEMEDICINE (OUTPATIENT)
Dept: FAMILY MEDICINE CLINIC | Facility: CLINIC | Age: 51
End: 2021-05-10
Payer: COMMERCIAL

## 2021-05-10 ENCOUNTER — TELEPHONE (OUTPATIENT)
Dept: FAMILY MEDICINE CLINIC | Facility: CLINIC | Age: 51
End: 2021-05-10

## 2021-05-10 DIAGNOSIS — M54.50 ACUTE RIGHT-SIDED LOW BACK PAIN WITHOUT SCIATICA: Primary | ICD-10-CM

## 2021-05-10 PROCEDURE — 99214 OFFICE O/P EST MOD 30 MIN: CPT | Performed by: FAMILY MEDICINE

## 2021-05-10 RX ORDER — HYDROCODONE BITARTRATE AND ACETAMINOPHEN 5; 325 MG/1; MG/1
1 TABLET ORAL EVERY 8 HOURS PRN
Qty: 20 TABLET | Refills: 0 | Status: SHIPPED | OUTPATIENT
Start: 2021-05-10

## 2021-05-10 RX ORDER — CYCLOBENZAPRINE HCL 10 MG
10 TABLET ORAL 3 TIMES DAILY
Qty: 30 TABLET | Refills: 1 | Status: SHIPPED | OUTPATIENT
Start: 2021-05-10 | End: 2021-05-30

## 2021-05-10 NOTE — PROGRESS NOTES
My Chart/ Video/Telephone Visit Check-In Due to Savage Lindsay verbally consents a video Check-In service on 05/10/21.   Patient understands and accepts financial responsibility for any deductible, co-insurance and/or co-pays assoc MASTECTOMY LEFT Left 07/31/2017   • MASTECTOMY RIGHT Right 07/31/2017   • OTHER      Exploratory Lap   • OTHER SURGICAL HISTORY  03/12/2018    Left breast deep inferior epigastric  free flap reconstruction from right hemiabdomen, Removal of tissu Visit:  Requested Prescriptions     Signed Prescriptions Disp Refills   • cyclobenzaprine 10 MG Oral Tab 30 tablet 1     Sig: Take 1 tablet (10 mg total) by mouth 3 (three) times daily for 20 days.    • HYDROcodone-acetaminophen (NORCO) 5-325 MG Oral Tab 20
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
## 2021-08-10 ENCOUNTER — TELEMEDICINE (OUTPATIENT)
Dept: FAMILY MEDICINE CLINIC | Facility: CLINIC | Age: 51
End: 2021-08-10
Payer: COMMERCIAL

## 2021-08-10 DIAGNOSIS — Z78.9 UNKNOWN STATUS OF IMMUNITY TO COVID-19 VIRUS: Primary | ICD-10-CM

## 2021-08-10 PROCEDURE — 99213 OFFICE O/P EST LOW 20 MIN: CPT | Performed by: FAMILY MEDICINE

## 2021-08-10 NOTE — PROGRESS NOTES
My Chart/ Video/Telephone Visit Check-In Due to Savage Lindsay verbally consents a video Check-In service on 08/10/21.   Patient understands and accepts financial responsibility for any deductible, co-insurance and/or co-pays assoc Removal of tissue expander, left breast, Extensive capsulectomy, left breast   • OTHER SURGICAL HISTORY  03/12/2018    Total capsulectomy right breast, Removal of right breast tissue expander, Delayed right breast reconstruction with EMPERATRIZ flap   • TONSILLE interest of the provider-patient relationship, due to the ongoing public health crisis/national emergency and because of restrictions of visitation. There are limitations of this visit as no physical exam could be performed.   Every conscious effort was ta

## 2021-08-13 ENCOUNTER — NURSE ONLY (OUTPATIENT)
Dept: FAMILY MEDICINE CLINIC | Facility: CLINIC | Age: 51
End: 2021-08-13
Payer: COMMERCIAL

## 2021-08-13 DIAGNOSIS — Z78.9 UNKNOWN STATUS OF IMMUNITY TO COVID-19 VIRUS: ICD-10-CM

## 2021-08-13 LAB — SARS-COV-2 IGG+IGM SERPL QL IA: NONREACTIVE

## 2021-08-13 PROCEDURE — 86769 SARS-COV-2 COVID-19 ANTIBODY: CPT | Performed by: FAMILY MEDICINE

## 2021-08-13 NOTE — PATIENT INSTRUCTIONS
Blood work drawn per orders in chart per   1 gold  22g left [facility]   Patient left the office in stable condition.

## 2021-08-14 ENCOUNTER — TELEPHONE (OUTPATIENT)
Dept: FAMILY MEDICINE CLINIC | Facility: CLINIC | Age: 51
End: 2021-08-14

## 2021-08-14 NOTE — TELEPHONE ENCOUNTER
----- Message from Aubree Panchal MD sent at 8/14/2021  7:58 AM CDT -----  Please let patient or caregiver know or leave message that:   Her COVID antibody test is non reactive.  This implies no previous infection or antibody protection is present against C

## 2022-07-26 ENCOUNTER — PATIENT MESSAGE (OUTPATIENT)
Dept: FAMILY MEDICINE CLINIC | Facility: CLINIC | Age: 52
End: 2022-07-26

## 2022-07-26 RX ORDER — HYDROCODONE BITARTRATE AND ACETAMINOPHEN 5; 325 MG/1; MG/1
1 TABLET ORAL EVERY 8 HOURS PRN
Qty: 20 TABLET | Refills: 0 | Status: SHIPPED | OUTPATIENT
Start: 2022-07-26

## 2022-07-26 RX ORDER — CYCLOBENZAPRINE HCL 10 MG
10 TABLET ORAL 3 TIMES DAILY PRN
Qty: 30 TABLET | Refills: 0 | Status: SHIPPED | OUTPATIENT
Start: 2022-07-26 | End: 2022-08-05

## 2022-07-26 NOTE — TELEPHONE ENCOUNTER
From: Bora Field  To: Gino Benjamin MD  Sent: 7/26/2022 8:46 AM CDT  Subject: Medication Refill    Good morning, Dr. Angelika Govea,    Would it be possible to get a refill for Cyclobenzaprine and Hydrocodone. My back is out again and I cannot get to the chiropractor because I am still on quarantine from [facility]. My right hip is incredibly tight and I am having a lot of trouble sitting. I don't have any shooting pain down my leg or anything. I just cannot get the muscle to release. If you can do a refill, can you please send it to the [facility] on [phone] in [place].      Thank you,    Eveline Garrison

## 2023-02-20 ENCOUNTER — TELEPHONE (OUTPATIENT)
Dept: FAMILY MEDICINE CLINIC | Facility: CLINIC | Age: 53
End: 2023-02-20

## 2023-02-20 NOTE — TELEPHONE ENCOUNTER
Advised patient of APRN's note below. Patient verbalized understanding. No further questions at this time.     Future Appointments   Date Time Provider Familia Burns   2/23/2023  2:40 PM Madhu Iqbal MD [facility] [place]

## 2023-02-20 NOTE — TELEPHONE ENCOUNTER
I think it is okay for patient to wait for appt. If acutely worsening, should go to the ER. Will forward to PCP to review as well.

## 2023-02-20 NOTE — TELEPHONE ENCOUNTER
PT HAS SCHEDULED MYCHART APT FOR:     CHEST PAIN. HAS HAD A HISTORY OR CHEST SURGERY. PT ADV THINKING IT COULD BE SCAR TISSUE. LAST FEW MONTHS - VERY STRESSFUL.  DOWN IN MIDDLE OF CHEST (STERNUM)     NO SOB, NO ARM PAIN     PLEASE TRIAGE    Future Appointments   Date Time Provider Familia Burns   2/23/2023  2:40 PM Ani Coy MD [facility] [place]

## 2023-02-20 NOTE — TELEPHONE ENCOUNTER
Pt reports chest pain  Does have hx Double mastectomy, reconstruction    Lots of \"Scar tissue\" - occasionally \"will give a twinge\"    Pt reports this past month - has been \"super stressed out\"    Pt reports Pain down middle of chest - \"Feels like tension, like stressed out\"  No arm pain, no SOB    Has been watching BPs - pt reports \"weird readings with home BP cuff\" - 129/88 then 5 mins later 188/95    2 months ago when donated blood - BP was 163/93  Pt reports White coat hypertension - but stated TJ usually calms pt and then rechecks - BP fine    Pt denies lightheadedness, no dizziness  Does have headaches - pt stated thinks due to being very stressed - tax season  Rates headaches Mild to moderate - pt taking OTC Advil - does help    Pt reports she is due for annual physical and labs, states haven't had physical since before covid    Advised pt that Dr. Kenya Silver not in office today, will have covering provider address - she v/u    Future Appointments   Date Time Provider Familia Burns   2/23/2023  2:40 PM Frandy [facility] [place] to keep future appt? Need to be seen sooner?   Please advise, thank you

## 2023-02-23 ENCOUNTER — OFFICE VISIT (OUTPATIENT)
Dept: FAMILY MEDICINE CLINIC | Facility: CLINIC | Age: 53
End: 2023-02-23
Payer: COMMERCIAL

## 2023-02-23 VITALS
RESPIRATION RATE: 16 BRPM | OXYGEN SATURATION: 97 % | DIASTOLIC BLOOD PRESSURE: 100 MMHG | BODY MASS INDEX: 44 KG/M2 | WEIGHT: 293 LBS | TEMPERATURE: 97 F | HEART RATE: 86 BPM | SYSTOLIC BLOOD PRESSURE: 140 MMHG

## 2023-02-23 DIAGNOSIS — R07.2 SUBSTERNAL CHEST PAIN: Primary | ICD-10-CM

## 2023-02-23 DIAGNOSIS — R03.0 ELEVATED BLOOD PRESSURE READING: ICD-10-CM

## 2023-02-23 LAB
ATRIAL RATE: 68 BPM
P AXIS: 49 DEGREES
P-R INTERVAL: 194 MS
Q-T INTERVAL: 426 MS
QRS DURATION: 94 MS
QTC CALCULATION (BEZET): 452 MS
R AXIS: -4 DEGREES
T AXIS: 9 DEGREES
VENTRICULAR RATE: 68 BPM

## 2023-02-23 PROCEDURE — 99214 OFFICE O/P EST MOD 30 MIN: CPT | Performed by: FAMILY MEDICINE

## 2023-02-23 PROCEDURE — 3080F DIAST BP >= 90 MM HG: CPT | Performed by: FAMILY MEDICINE

## 2023-02-23 PROCEDURE — 3077F SYST BP >= 140 MM HG: CPT | Performed by: FAMILY MEDICINE

## 2023-02-23 PROCEDURE — 93000 ELECTROCARDIOGRAM COMPLETE: CPT | Performed by: FAMILY MEDICINE

## 2023-02-23 RX ORDER — OLMESARTAN MEDOXOMIL 20 MG/1
20 TABLET ORAL DAILY
Qty: 90 TABLET | Refills: 0 | Status: SHIPPED | OUTPATIENT
Start: 2023-02-23

## 2023-03-14 ENCOUNTER — OFFICE VISIT (OUTPATIENT)
Dept: FAMILY MEDICINE CLINIC | Facility: CLINIC | Age: 53
End: 2023-03-14
Payer: COMMERCIAL

## 2023-03-14 ENCOUNTER — HOSPITAL ENCOUNTER (OUTPATIENT)
Dept: CV DIAGNOSTICS | Facility: HOSPITAL | Age: 53
Discharge: HOME | End: 2023-03-14
Attending: FAMILY MEDICINE
Payer: COMMERCIAL

## 2023-03-14 VITALS
WEIGHT: 293 LBS | SYSTOLIC BLOOD PRESSURE: 128 MMHG | OXYGEN SATURATION: 97 % | DIASTOLIC BLOOD PRESSURE: 89 MMHG | TEMPERATURE: 97 F | RESPIRATION RATE: 16 BRPM | HEART RATE: 97 BPM | BODY MASS INDEX: 43 KG/M2

## 2023-03-14 DIAGNOSIS — R07.2 SUBSTERNAL CHEST PAIN: ICD-10-CM

## 2023-03-14 DIAGNOSIS — R03.0 ELEVATED BLOOD PRESSURE READING: ICD-10-CM

## 2023-03-14 DIAGNOSIS — I10 HYPERTENSION, UNSPECIFIED TYPE: Primary | ICD-10-CM

## 2023-03-14 DIAGNOSIS — R94.31 EKG, ABNORMAL: ICD-10-CM

## 2023-03-14 DIAGNOSIS — M54.50 MIDLINE LOW BACK PAIN, UNSPECIFIED CHRONICITY, UNSPECIFIED WHETHER SCIATICA PRESENT: ICD-10-CM

## 2023-03-14 LAB
ALT SERPL-CCNC: 33 U/L
ANION GAP SERPL CALC-SCNC: 9 MMOL/L (ref 0–18)
AST SERPL-CCNC: 21 U/L (ref 15–37)
BUN BLD-MCNC: 16 MG/DL (ref 7–18)
CALCIUM BLD-MCNC: 9.4 MG/DL (ref 8.5–10.1)
CHLORIDE SERPL-SCNC: 108 MMOL/L (ref 98–112)
CHOLEST SERPL-MCNC: 257 MG/DL (ref ?–200)
CO2 SERPL-SCNC: 26 MMOL/L (ref 21–32)
CREAT BLD-MCNC: 0.79 MG/DL
ERYTHROCYTE [DISTWIDTH] IN BLOOD BY AUTOMATED COUNT: 13.5 %
FASTING PATIENT LIPID ANSWER: YES
FASTING STATUS PATIENT QL REPORTED: YES
GFR SERPLBLD BASED ON 1.73 SQ M-ARVRAT: 89 ML/MIN/1.73M2 (ref 60–?)
GLUCOSE BLD-MCNC: 101 MG/DL (ref 70–99)
HCT VFR BLD AUTO: 46.4 %
HDLC SERPL-MCNC: 44 MG/DL (ref 40–59)
HGB BLD-MCNC: 15 G/DL
LDLC SERPL CALC-MCNC: 185 MG/DL (ref ?–100)
MCH RBC QN AUTO: 28.4 PG (ref 26–34)
MCHC RBC AUTO-ENTMCNC: 32.3 G/DL (ref 31–37)
MCV RBC AUTO: 87.7 FL
NONHDLC SERPL-MCNC: 213 MG/DL (ref ?–130)
OSMOLALITY SERPL CALC.SUM OF ELEC: 297 MOSM/KG (ref 275–295)
PLATELET # BLD AUTO: 245 10(3)UL (ref 150–450)
POTASSIUM SERPL-SCNC: 4 MMOL/L (ref 3.5–5.1)
RBC # BLD AUTO: 5.29 X10(6)UL
SODIUM SERPL-SCNC: 143 MMOL/L (ref 136–145)
TRIGL SERPL-MCNC: 153 MG/DL (ref 30–149)
TSI SER-ACNC: 1.51 MIU/ML (ref 0.36–3.74)
VLDLC SERPL CALC-MCNC: 32 MG/DL (ref 0–30)
WBC # BLD AUTO: 6.7 X10(3) UL (ref 4–11)

## 2023-03-14 PROCEDURE — 80048 BASIC METABOLIC PNL TOTAL CA: CPT | Performed by: FAMILY MEDICINE

## 2023-03-14 PROCEDURE — 84450 TRANSFERASE (AST) (SGOT): CPT | Performed by: FAMILY MEDICINE

## 2023-03-14 PROCEDURE — 93350 STRESS TTE ONLY: CPT | Performed by: FAMILY MEDICINE

## 2023-03-14 PROCEDURE — 3074F SYST BP LT 130 MM HG: CPT | Performed by: FAMILY MEDICINE

## 2023-03-14 PROCEDURE — 93017 CV STRESS TEST TRACING ONLY: CPT | Performed by: FAMILY MEDICINE

## 2023-03-14 PROCEDURE — 80061 LIPID PANEL: CPT | Performed by: FAMILY MEDICINE

## 2023-03-14 PROCEDURE — 84443 ASSAY THYROID STIM HORMONE: CPT | Performed by: FAMILY MEDICINE

## 2023-03-14 PROCEDURE — 84460 ALANINE AMINO (ALT) (SGPT): CPT | Performed by: FAMILY MEDICINE

## 2023-03-14 PROCEDURE — 93018 CV STRESS TEST I&R ONLY: CPT | Performed by: FAMILY MEDICINE

## 2023-03-14 PROCEDURE — 3079F DIAST BP 80-89 MM HG: CPT | Performed by: FAMILY MEDICINE

## 2023-03-14 PROCEDURE — 85027 COMPLETE CBC AUTOMATED: CPT | Performed by: FAMILY MEDICINE

## 2023-03-14 PROCEDURE — 99214 OFFICE O/P EST MOD 30 MIN: CPT | Performed by: FAMILY MEDICINE

## 2023-03-14 RX ORDER — HYDROCODONE BITARTRATE AND ACETAMINOPHEN 5; 325 MG/1; MG/1
1 TABLET ORAL EVERY 8 HOURS PRN
Qty: 20 TABLET | Refills: 0 | Status: SHIPPED | OUTPATIENT
Start: 2023-03-14

## 2023-03-14 RX ORDER — CYCLOBENZAPRINE HCL 10 MG
10 TABLET ORAL 3 TIMES DAILY PRN
Qty: 30 TABLET | Refills: 0 | Status: SHIPPED | OUTPATIENT
Start: 2023-03-14 | End: 2023-03-24

## 2023-03-15 ENCOUNTER — TELEPHONE (OUTPATIENT)
Dept: FAMILY MEDICINE CLINIC | Facility: CLINIC | Age: 53
End: 2023-03-15

## 2023-03-15 NOTE — TELEPHONE ENCOUNTER
Patient's name and  verified   Future Appointments   Date Time Provider Familia Burns   2023  4:00 PM Stacy Farrar MD [facility] [place]       Patient notified and verbalized an understanding

## 2023-03-15 NOTE — TELEPHONE ENCOUNTER
----- Message from Stephy Echeverria RN sent at 3/15/2023  9:28 AM CDT -----  Patient's name and  verified   Patient would like to try to shift around her diet and exercise if okay with Dr Constanza Soria  Patient notified and verbalized an understanding

## 2023-03-31 ENCOUNTER — TELEPHONE (OUTPATIENT)
Dept: FAMILY MEDICINE CLINIC | Facility: CLINIC | Age: 53
End: 2023-03-31

## 2023-03-31 NOTE — TELEPHONE ENCOUNTER
Slightly abnormal stress test    Referred to dr Shayy Pope get in until 7/19    Ok to wait or go to another cardio?     Please adv  Thank you

## 2023-04-03 NOTE — TELEPHONE ENCOUNTER
Advised patient of Doctor's note below. Patient verbalized understanding. Advised she may call to schedule with sooner available provider - she v/u. No further questions at this time.

## 2023-04-21 ENCOUNTER — TELEPHONE (OUTPATIENT)
Dept: FAMILY MEDICINE CLINIC | Facility: CLINIC | Age: 53
End: 2023-04-21

## 2023-04-21 NOTE — TELEPHONE ENCOUNTER
I think if she is having follow up with the Urologists then we do not need to waste her time following up with me.   Thanks

## 2023-04-21 NOTE — TELEPHONE ENCOUNTER
Pt was in ER this pas week w/ kidney stones; woke up and found blood in her urine and would like to know if that's for concern or normal given she was already seen for kidney stones    LOV: 03/31/23

## 2023-04-21 NOTE — TELEPHONE ENCOUNTER
Blood in the urine is very common with kidney stones. Is she following up with Urology?   If not then I recommend she see [facility]: P# [phone]  Thanks

## 2023-04-21 NOTE — TELEPHONE ENCOUNTER
Patient's name and  verified   Patient went to ER for kidney stones was in the hospital for 4 days. One stone was 7 mm. Patient went in for surgery and ended up having infection, so they placed stents. No date set for next surgery to remove the stones. Patient stated she will then have one more surgery to remove the stents    Patient was told to follow up with Dr Azam Kendrick.     Patient notified and verbalized an understanding

## 2023-05-02 ENCOUNTER — MED REC SCAN ONLY (OUTPATIENT)
Dept: FAMILY MEDICINE CLINIC | Facility: CLINIC | Age: 53
End: 2023-05-02

## 2023-05-05 PROBLEM — I44.7 LEFT BUNDLE BRANCH BLOCK (LBBB): Status: ACTIVE | Noted: 2023-04-27

## 2023-05-16 ENCOUNTER — MED REC SCAN ONLY (OUTPATIENT)
Dept: FAMILY MEDICINE CLINIC | Facility: CLINIC | Age: 53
End: 2023-05-16

## 2023-06-09 ENCOUNTER — HOSPITAL ENCOUNTER (OUTPATIENT)
Dept: GENERAL RADIOLOGY | Age: 53
Discharge: HOME | End: 2023-06-09
Attending: FAMILY MEDICINE
Payer: COMMERCIAL

## 2023-06-09 ENCOUNTER — OFFICE VISIT (OUTPATIENT)
Dept: FAMILY MEDICINE CLINIC | Facility: CLINIC | Age: 53
End: 2023-06-09
Payer: COMMERCIAL

## 2023-06-09 VITALS
HEART RATE: 91 BPM | WEIGHT: 293 LBS | DIASTOLIC BLOOD PRESSURE: 88 MMHG | SYSTOLIC BLOOD PRESSURE: 122 MMHG | RESPIRATION RATE: 18 BRPM | TEMPERATURE: 98 F | BODY MASS INDEX: 41 KG/M2 | OXYGEN SATURATION: 96 %

## 2023-06-09 DIAGNOSIS — M25.511 CHRONIC RIGHT SHOULDER PAIN: ICD-10-CM

## 2023-06-09 DIAGNOSIS — I10 HYPERTENSION, UNSPECIFIED TYPE: Primary | ICD-10-CM

## 2023-06-09 DIAGNOSIS — G89.29 CHRONIC RIGHT SHOULDER PAIN: ICD-10-CM

## 2023-06-09 DIAGNOSIS — H93.8X2 IRRITATION OF LEFT EAR: ICD-10-CM

## 2023-06-09 PROCEDURE — 99214 OFFICE O/P EST MOD 30 MIN: CPT | Performed by: FAMILY MEDICINE

## 2023-06-09 PROCEDURE — 73030 X-RAY EXAM OF SHOULDER: CPT | Performed by: FAMILY MEDICINE

## 2023-06-09 PROCEDURE — 3074F SYST BP LT 130 MM HG: CPT | Performed by: FAMILY MEDICINE

## 2023-06-09 PROCEDURE — 3079F DIAST BP 80-89 MM HG: CPT | Performed by: FAMILY MEDICINE

## 2023-06-10 ENCOUNTER — PATIENT MESSAGE (OUTPATIENT)
Dept: FAMILY MEDICINE CLINIC | Facility: CLINIC | Age: 53
End: 2023-06-10

## 2023-06-13 ENCOUNTER — TELEPHONE (OUTPATIENT)
Dept: ORTHOPEDICS CLINIC | Facility: CLINIC | Age: 53
End: 2023-06-13

## 2023-06-13 NOTE — TELEPHONE ENCOUNTER
Imaging was completed for this patient for a Rt Shoulder- Bone Spur, but it needs to be reviewed to see if additional imaging is needed. If so, please enter the appropriate RX and let the patient know to come in before their appointment to complete the additional imaging. Thank you!     Future Appointments   Date Time Provider Familia Katy   6/23/2023  9:10 AM Veronica Griffith MD [facility] [place] [id number]   9/19/2023  4:00 PM Jeannett Rinne, MD [facility] [place]

## 2023-06-19 ENCOUNTER — TELEPHONE (OUTPATIENT)
Dept: FAMILY MEDICINE CLINIC | Facility: CLINIC | Age: 53
End: 2023-06-19

## 2023-06-19 NOTE — TELEPHONE ENCOUNTER
Pt had a x-ray on June 9th of her shoulder, she needs it put on a CD.  She wants to pick it up tomorrow around 2:00

## 2023-06-19 NOTE — TELEPHONE ENCOUNTER
Copy made per [facility] and placed at . Patient notified and verbalized understanding.

## 2023-06-20 ENCOUNTER — MED REC SCAN ONLY (OUTPATIENT)
Dept: FAMILY MEDICINE CLINIC | Facility: CLINIC | Age: 53
End: 2023-06-20

## 2023-06-23 ENCOUNTER — OFFICE VISIT (OUTPATIENT)
Dept: ORTHOPEDICS CLINIC | Facility: CLINIC | Age: 53
End: 2023-06-23
Payer: COMMERCIAL

## 2023-06-23 VITALS — WEIGHT: 290 LBS | HEIGHT: 71 IN | BODY MASS INDEX: 40.6 KG/M2

## 2023-06-23 DIAGNOSIS — M19.019 GLENOHUMERAL ARTHRITIS: Primary | ICD-10-CM

## 2023-06-23 PROCEDURE — 99204 OFFICE O/P NEW MOD 45 MIN: CPT | Performed by: ORTHOPAEDIC SURGERY

## 2023-06-23 PROCEDURE — 3008F BODY MASS INDEX DOCD: CPT | Performed by: ORTHOPAEDIC SURGERY

## 2023-08-10 ENCOUNTER — PATIENT MESSAGE (OUTPATIENT)
Dept: FAMILY MEDICINE CLINIC | Facility: CLINIC | Age: 53
End: 2023-08-10

## 2023-08-10 PROBLEM — Z90.13 ABSENCE OF BOTH BREASTS: Status: RESOLVED | Noted: 2020-08-19 | Resolved: 2023-08-10

## 2023-08-10 PROBLEM — Z83.2 FAMILY HISTORY OF BLOOD DYSCRASIA: Status: RESOLVED | Noted: 2017-05-12 | Resolved: 2023-08-10

## 2023-08-10 RX ORDER — ASPIRIN 81 MG/1
81 TABLET ORAL DAILY
COMMUNITY
Start: 2023-02-23

## 2023-08-10 RX ORDER — HYDROCODONE BITARTRATE AND ACETAMINOPHEN 5; 325 MG/1; MG/1
1 TABLET ORAL EVERY 8 HOURS PRN
Qty: 20 TABLET | Refills: 0 | Status: SHIPPED | OUTPATIENT
Start: 2023-08-10

## 2023-08-10 RX ORDER — CYCLOBENZAPRINE HCL 10 MG
10 TABLET ORAL 3 TIMES DAILY PRN
Qty: 30 TABLET | Refills: 0 | Status: SHIPPED | OUTPATIENT
Start: 2023-08-10 | End: 2023-08-20

## 2023-08-10 NOTE — TELEPHONE ENCOUNTER
From: Jose Busby  To: Allison Romero MD  Sent: 8/10/2023 8:19 AM CDT  Subject: Medication Refill    Good morning, Dr. Myriam Ellsworth! Can I please get a refill for the Hydrocodone and Cyclobenzaprine? Thanks!

## 2023-09-19 ENCOUNTER — OFFICE VISIT (OUTPATIENT)
Dept: FAMILY MEDICINE CLINIC | Facility: CLINIC | Age: 53
End: 2023-09-19
Payer: COMMERCIAL

## 2023-09-19 VITALS
BODY MASS INDEX: 41 KG/M2 | OXYGEN SATURATION: 98 % | HEART RATE: 85 BPM | WEIGHT: 293 LBS | DIASTOLIC BLOOD PRESSURE: 84 MMHG | RESPIRATION RATE: 16 BRPM | TEMPERATURE: 97 F | SYSTOLIC BLOOD PRESSURE: 124 MMHG

## 2023-09-19 DIAGNOSIS — E78.5 HYPERLIPIDEMIA, UNSPECIFIED HYPERLIPIDEMIA TYPE: ICD-10-CM

## 2023-09-19 DIAGNOSIS — G89.29 CHRONIC RIGHT SHOULDER PAIN: ICD-10-CM

## 2023-09-19 DIAGNOSIS — R07.89 ATYPICAL CHEST PAIN: ICD-10-CM

## 2023-09-19 DIAGNOSIS — I10 HYPERTENSION, UNSPECIFIED TYPE: Primary | ICD-10-CM

## 2023-09-19 DIAGNOSIS — M25.511 CHRONIC RIGHT SHOULDER PAIN: ICD-10-CM

## 2023-09-19 LAB
CHOLEST SERPL-MCNC: 262 MG/DL (ref ?–200)
FASTING PATIENT LIPID ANSWER: YES
HDLC SERPL-MCNC: 47 MG/DL (ref 40–59)
LDLC SERPL CALC-MCNC: 196 MG/DL (ref ?–100)
NONHDLC SERPL-MCNC: 215 MG/DL (ref ?–130)
TRIGL SERPL-MCNC: 105 MG/DL (ref 30–149)
VLDLC SERPL CALC-MCNC: 22 MG/DL (ref 0–30)

## 2023-09-19 PROCEDURE — 3079F DIAST BP 80-89 MM HG: CPT | Performed by: FAMILY MEDICINE

## 2023-09-19 PROCEDURE — 3074F SYST BP LT 130 MM HG: CPT | Performed by: FAMILY MEDICINE

## 2023-09-19 PROCEDURE — 80061 LIPID PANEL: CPT | Performed by: FAMILY MEDICINE

## 2023-09-19 PROCEDURE — 99214 OFFICE O/P EST MOD 30 MIN: CPT | Performed by: FAMILY MEDICINE

## 2023-09-23 ENCOUNTER — HOSPITAL ENCOUNTER (OUTPATIENT)
Dept: CT IMAGING | Age: 53
Discharge: HOME | End: 2023-09-23
Attending: FAMILY MEDICINE

## 2023-09-23 DIAGNOSIS — E78.5 HYPERLIPIDEMIA, UNSPECIFIED HYPERLIPIDEMIA TYPE: ICD-10-CM

## 2023-10-02 ENCOUNTER — TELEPHONE (OUTPATIENT)
Dept: FAMILY MEDICINE CLINIC | Facility: CLINIC | Age: 53
End: 2023-10-02

## 2023-10-02 NOTE — TELEPHONE ENCOUNTER
Advised patient of Doctor's note below. Patient verbalized understanding. Pt reports will hold off on starting medication at this time  Pt reports will continue to work on diet and exercise    Pt asking - saw results and read  \"CARDIAC INCIDENTAL FINDING: Possible dilated thoracic Aorta ( 4.0 cm). If indicated, recommend Gated CT angiogram of thoracic aorta for further evaluation\"    Pt would like clarification, should she be concerned? Advised pt that Dr. Constance Adkins not in office today, will be back tomorrow - she v/u.     Please advise, thank you

## 2023-10-02 NOTE — TELEPHONE ENCOUNTER
Advised patient of Doctor's note below. Patient verbalized understanding. Pt reports already had cardiac work-up with [facility] over the summer - couldn't remember name of doctor, but states had cardiac US done, etc, and everything was fine?     Advised pt will notify Dr. French Ruiz - she v/u  Please advise, thank you

## 2023-10-02 NOTE — TELEPHONE ENCOUNTER
Yes. Her ECHO showed the aortic to be 3.9 cm, which is comparable to the CT scan size. No need to do anything at this time other then to keep BP < 140/90. Cardiology had wanted to  see her in one year. That would be a perfect time to follow up the aorta as well.   Thanks

## 2023-10-02 NOTE — TELEPHONE ENCOUNTER
----- Message from Gab Wilhelm MD sent at 9/30/2023  9:30 AM CDT -----  Please let patient or caregiver know or leave message that the CT heart score was 7.25. This implies that minimal plaque build up is present and that is it very unlikely she has significant blockage. Her 10-year calculated risk score based on risk factors is low at 3.3%. We don't typically treat lipids until the score is approaching 10%. For now, we can hold off starting a lipid lowering medication. On the flip side, if she wants to be more aggressive, I can start her on a lipid lowering medication in an effort to keep her risk scores low. Let me know if she wants to start medication.   Thanks

## 2023-10-02 NOTE — TELEPHONE ENCOUNTER
Thanks her for pointing that out. It appears she may have a mildly dilated aorta. This is something I have patients see Cardiology for to assess if further workup is needed. Let's have her see: [address] [place] [address],  Dr Zehra Baca, P: [phone]   It's OK if the appt is a few months out.   Thanks

## 2023-10-03 NOTE — TELEPHONE ENCOUNTER
It's dilated. This means the diameter of the aorta is slightly larger then normal. It is typically asymptomatic. We would be concerned if she has any tearing type chest pain.   Thanks

## 2023-10-03 NOTE — TELEPHONE ENCOUNTER
Patient's name and  verified   Patient would like more clarification on what Dilation means. and what symptoms to watch for?      Informed patient to review in more details with the cardiology    Please Advise  Thank you

## 2023-10-03 NOTE — TELEPHONE ENCOUNTER
Patient's name and  verified   Patient has an appointment on Thursday  Patient notified and verbalized an understanding

## 2023-10-06 ENCOUNTER — TELEPHONE (OUTPATIENT)
Dept: FAMILY MEDICINE CLINIC | Facility: CLINIC | Age: 53
End: 2023-10-06

## 2023-10-06 NOTE — TELEPHONE ENCOUNTER
Patient's name and  verified      Patient wanted to give an update:  Patient did see the Cardiologist yesterday    Patient spoke to cardiologist, he stated Electrocardiogram measurement is compared to patient height and weight    Cardiologist ordered a Gated CT scan-entire picture of the heart  Everything is fine patient is okay     Patient also discussed stopping the BP medication with Cardiologist and gave her guidelines over 120 stay on her blood pressure medication    Patient is going to give it a month from now until she stops the blood pressure medication. Patient recent at home reading:   Patient has been running on the lower dose of Olmesartan  B/P 105/65  B/P 110/80  Patient BP was 127/82 at the cardiologist office yesterday.      Patient notified and verbalized an understanding

## 2023-10-09 ENCOUNTER — ORDER TRANSCRIPTION (OUTPATIENT)
Dept: ADMINISTRATIVE | Facility: HOSPITAL | Age: 53
End: 2023-10-09

## 2023-10-09 DIAGNOSIS — I77.810 DILATION OF THORACIC AORTA (HCC): Primary | ICD-10-CM

## 2023-10-30 PROBLEM — I77.810 DILATION OF THORACIC AORTA: Status: ACTIVE | Noted: 2023-10-05

## 2023-10-30 PROBLEM — I77.810 DILATION OF THORACIC AORTA (HCC): Status: ACTIVE | Noted: 2023-10-05

## 2023-11-01 NOTE — IMAGING NOTE
Call placed to pt regarding CTA Gated Thoracic. Instructed to arrive at 07:45 AM.  May eat a light breakfast/lunch but drink plenty of fluids a day before and morning of procedure. .   Advised to hold caffeine 12 hrs prior to procedure. May take usual meds.

## 2023-11-06 ENCOUNTER — HOSPITAL ENCOUNTER (OUTPATIENT)
Dept: CT IMAGING | Facility: HOSPITAL | Age: 53
Discharge: HOME | End: 2023-11-06
Attending: INTERNAL MEDICINE
Payer: COMMERCIAL

## 2023-11-06 VITALS — SYSTOLIC BLOOD PRESSURE: 114 MMHG | HEART RATE: 73 BPM | DIASTOLIC BLOOD PRESSURE: 76 MMHG

## 2023-11-06 DIAGNOSIS — I77.810 DILATION OF THORACIC AORTA (HCC): ICD-10-CM

## 2023-11-06 LAB
CREAT BLD-MCNC: 0.7 MG/DL
EGFRCR SERPLBLD CKD-EPI 2021: 103 ML/MIN/1.73M2 (ref 60–?)

## 2023-11-06 PROCEDURE — 71275 CT ANGIOGRAPHY CHEST: CPT | Performed by: INTERNAL MEDICINE

## 2023-11-06 PROCEDURE — 82565 ASSAY OF CREATININE: CPT

## 2023-11-06 NOTE — IMAGING NOTE
Tresa Bills to CT Rm 4. Pt denies use of long acting nitrates like, Imdur, Cialis, Levitra and Viagra. Positioned pt on table. Procedure explained and questions answered. Vital signs monitored and noted in Flowsheet. GFR = 103  Contrast injected followed by saline flush at 0823  Contrast = 75ml  0.9 NS flush = 68ml  Average HR = 73bpm    Patient tolerated the procedure without complication. Denies any contrast reaction. Discontinued IV saline lock. Escorted pt to [facility]' Dressing Room and discharged in stable condition.

## 2024-01-17 DIAGNOSIS — M54.50 MIDLINE LOW BACK PAIN, UNSPECIFIED CHRONICITY, UNSPECIFIED WHETHER SCIATICA PRESENT: Primary | ICD-10-CM

## 2024-01-17 RX ORDER — CYCLOBENZAPRINE HCL 10 MG
10 TABLET ORAL 3 TIMES DAILY PRN
Qty: 60 TABLET | Refills: 0 | Status: SHIPPED | OUTPATIENT
Start: 2024-01-17

## 2024-01-17 RX ORDER — HYDROCODONE BITARTRATE AND ACETAMINOPHEN 5; 325 MG/1; MG/1
1 TABLET ORAL EVERY 6 HOURS PRN
Qty: 30 TABLET | Refills: 0 | Status: SHIPPED | OUTPATIENT
Start: 2024-01-17

## 2024-01-17 RX ORDER — AZITHROMYCIN 250 MG/1
TABLET, FILM COATED ORAL
Qty: 6 TABLET | Refills: 0 | Status: SHIPPED | OUTPATIENT
Start: 2024-01-17 | End: 2024-01-21

## 2024-01-17 NOTE — TELEPHONE ENCOUNTER
Pt leaving country Feb 1st and out of the country for 2 weeks. Asking for antibiotic to [place]    Also asking for Hydrocodone refill and muscle relaxer refill

## 2024-05-06 RX ORDER — OLMESARTAN MEDOXOMIL 20 MG/1
20 TABLET ORAL DAILY
Qty: 90 TABLET | Refills: 1 | Status: SHIPPED | OUTPATIENT
Start: 2024-05-06

## 2024-07-05 ENCOUNTER — MED REC SCAN ONLY (OUTPATIENT)
Dept: FAMILY MEDICINE CLINIC | Facility: CLINIC | Age: 54
End: 2024-07-05

## 2024-10-07 ENCOUNTER — PATIENT MESSAGE (OUTPATIENT)
Dept: FAMILY MEDICINE CLINIC | Facility: CLINIC | Age: 54
End: 2024-10-07

## 2024-10-07 NOTE — TELEPHONE ENCOUNTER
Future Appointments   Date Time Provider Department Center   10/8/2024  7:40 AM Hector Lezama MD [facility] [place]

## 2024-10-07 NOTE — TELEPHONE ENCOUNTER
From: Lucy Mitchell  To: Hector Lezama  Sent: 10/7/2024 11:08 AM CDT  Subject: UTI    Hello Dr. Horta!    I have a UTI. I have been trying to combat it with lots of cranberry juice and water, but it doesn't want to give up. It is has gotten better with the juice and water, but still not gone. I think I might need an antibiotic to get ride of it completely.

## 2024-10-08 ENCOUNTER — TELEMEDICINE (OUTPATIENT)
Dept: FAMILY MEDICINE CLINIC | Facility: CLINIC | Age: 54
End: 2024-10-08
Payer: COMMERCIAL

## 2024-10-08 DIAGNOSIS — R30.0 DYSURIA: Primary | ICD-10-CM

## 2024-10-08 PROCEDURE — 99213 OFFICE O/P EST LOW 20 MIN: CPT | Performed by: FAMILY MEDICINE

## 2024-10-08 RX ORDER — SULFAMETHOXAZOLE/TRIMETHOPRIM 800-160 MG
1 TABLET ORAL 2 TIMES DAILY
Qty: 14 TABLET | Refills: 0 | Status: SHIPPED | OUTPATIENT
Start: 2024-10-08

## 2024-10-08 NOTE — PROGRESS NOTES
My Chart/ Video/Telephone Visit Check-In    Lucy Mitchell and/or caregiver verbally consents a video Check-In service on 10/08/24.  Patient understands and accepts financial responsibility for any deductible, co-insurance and/or co-pays associated with this service.    Duration of the service including reviewing the chart, engaging with the patient and giving medical guidance: 11 minutes    Lucy Mitchell is a 54 year old female.    CC:  UTI    HPI:  The patient complains of uti.  Duration: 1 week(s)  Symptoms: dysuria-mild,  frequency- mild, foul urine odor  Denies: gross hematuria, fever or back pain  Associated symptoms: none  Aggravating factors: none  Alleviating factors: pushing fluids has helped reduce the odor of the urine  Treatment: as above   Allergies:  No Known Allergies   Current Meds:  Current Outpatient Medications   Medication Sig Dispense Refill    olmesartan 20 MG Oral Tab Take 1 tablet (20 mg total) by mouth daily. 90 tablet 1    HYDROcodone-acetaminophen (NORCO) 5-325 MG Oral Tab Take 1 tablet by mouth every 6 (six) hours as needed for Pain. 30 tablet 0    cyclobenzaprine 10 MG Oral Tab Take 1 tablet (10 mg total) by mouth 3 (three) times daily as needed for Muscle spasms. 60 tablet 0    Omeprazole 40 MG Oral Capsule Delayed Release Take 1 capsule (40 mg total) by mouth daily. 90 capsule 3    acidophilus-pectin Oral Cap Take 1 capsule by mouth daily.      aspirin (ST ANDREW ASPIRIN) 81 MG Oral Tab EC Take 1 tablet (81 mg total) by mouth daily.          History:  Past Medical History:    Abdominal hernia    Anesthesia complication    slow to arouse; was on CPAP following surgery 6/2017 at [facility]; no issue with anesthesia for 7/31/17 surgery    Back pain    Bloating    Chest pain    Chest pain on exertion    Family history of factor V Leiden mutation    mother positive, patient Tested negative for the mutation    Flatulence/gas pain/belching    Hemorrhoids    History of kidney  stones    passed on her own    History of seizures as a child    12y/o grand mal seizure x1; medication for 1 year; no known cause; no longer on medication    Night sweats    Stress    Visual impairment    glasses    Wears glasses    Weight gain      Past Surgical History:   Procedure Laterality Date    Breast surgery Bilateral 2017    Lateral capsulorrhaphy and Paz pattern skin excision, 22 modifier     delivery only      Colonoscopy      Cystoscopy,remv ureteral stone  2023    Cystoscopy, Left Ureteroscopy with basket stone removal, Right Ureteroscopy Bilateral ureteral stent exchange    Hysterectomy      Total abdominal hysterectomy with salpingectomy    Hysteroscopy      Insert tissue expander(s) Bilateral 2017    133 MX-16-T 850 mL tissue expander     Mastectomy left Left 2017    Mastectomy right Right 2017    Other      Exploratory Lap    Other surgical history  2018    Left breast deep inferior epigastric  free flap reconstruction from right hemiabdomen, Removal of tissue expander, left breast, Extensive capsulectomy, left breast    Other surgical history  2018    Total capsulectomy right breast, Removal of right breast tissue expander, Delayed right breast reconstruction with EMPERATRIZ flap    Removal of kidney stone Bilateral 2023    Tonsillectomy      Total abdom hysterectomy        Family History   Problem Relation Age of Onset    Breast Cancer Mother         DCIS    Other (Other) Mother         Factor V leiden    Bleeding Disorders Mother         Factor V Leiden    Diabetes Mother     Hypertension Mother     Breast Cancer Sister         BRCA 2 positive, invasive ductal carcinoma     Heart Disorder Father     Mental Disorder Father         Bi-Polar    Breast Cancer Other         Aunt    Breast Cancer Sister       Family Status   Relation Status    Mo Alive    Sis Alive    Fa     Other (Not Specified)    Sis (Not Specified)      Social  History     Socioeconomic History    Marital status: Single   Tobacco Use    Smoking status: Former     Current packs/day: 0.00     Average packs/day: 0.5 packs/day for 20.0 years (10.0 ttl pk-yrs)     Types: Cigarettes     Start date: 10/11/1992     Quit date: 10/11/2012     Years since quittin.0    Smokeless tobacco: Former   Substance and Sexual Activity    Alcohol use: No    Drug use: No   Other Topics Concern    Caffeine Concern Yes     Comment: 1 cup coffee daily    Exercise No     Social Determinants of Health     Food Insecurity: No Food Insecurity (2023)    Received from [facility]    Food Insecurity     Currently or in the past 3 months, have you worried your food would run out before you had money to buy more?: No     In the past 12 months, have you run out of food or been unable to get more?: No   Transportation Needs: No Transportation Needs (2023)    Received from [facility]    Transportation Needs     Medical Transportation Needs?: No    Received from [facility]    Social Connections    Received from [facility]    Housing Stability        ROS:  General: energy level stable       Physical Exam:  General: No apparent distress when conversing with me  Psych: Alert and oriented x 3, speech was not pressured  Resp: No respiratory distress noted when conversing with me, able to speak in full sentences.     ASSESSMENT AND PLAN    1. Dysuria  Likely UTI  She is unable to drop urine sample off as [place]  Take prescribed medications as directed.   Push fluids  She is to let me know if not improving or symptoms worsen.      No follow-ups on file.    Orders for this visit:    No orders of the defined types were placed in this encounter.      None    Meds & Refills for this Visit:  Requested Prescriptions     Signed Prescriptions Disp Refills    sulfamethoxazole-trimethoprim DS (BACTRIM  DS) 800-160 MG Oral Tab per tablet 14 tablet 0     Sig: Take 1 tablet by mouth 2 (two) times daily. 1 tab bid x 7 days             Authorized by Hector Lezama M.D.      Please note that the following visit was completed using two-way, real-time interactive audio and/or video communication.  This has been done in good velasquez to provide continuity of care in the best interest of the provider-patient relationship, due to the ongoing public health crisis/national emergency and because of restrictions of visitation.  There are limitations of this visit as no physical exam could be performed.  Every conscious effort was taken to allow for sufficient and adequate time.  This billing was spent on reviewing labs, medications, radiology tests and decision making.  Appropriate medical decision-making and tests are ordered as detailed in the plan of care above.”

## 2024-10-28 ENCOUNTER — TELEPHONE (OUTPATIENT)
Dept: FAMILY MEDICINE CLINIC | Facility: CLINIC | Age: 54
End: 2024-10-28

## 2024-10-28 ENCOUNTER — PATIENT MESSAGE (OUTPATIENT)
Dept: FAMILY MEDICINE CLINIC | Facility: CLINIC | Age: 54
End: 2024-10-28

## 2024-10-28 NOTE — TELEPHONE ENCOUNTER
Patient just got back from vacation    She has 2 spots on skin that she feels may be some sort of bites    She is sending a picture via My Chart but wanted to speak with the nurse    Please adv  Thank you

## 2024-10-28 NOTE — TELEPHONE ENCOUNTER
Please advise. Thank you  Patient reports:  Recent trip to [place]  Has bite on left temple and hand  Believes its spider bite  See attached pictures  Hand is itchy  Left temple bite not itchy  Left temple is not getting large in size patient zack Barrow around bite)  Blisters are starting to appear  Using benadryl cream    Denies:  Any discharge from bites

## 2024-10-29 ENCOUNTER — TELEMEDICINE (OUTPATIENT)
Dept: FAMILY MEDICINE CLINIC | Facility: CLINIC | Age: 54
End: 2024-10-29
Payer: COMMERCIAL

## 2024-10-29 DIAGNOSIS — L98.9 SKIN LESIONS: Primary | ICD-10-CM

## 2024-10-29 PROCEDURE — 99214 OFFICE O/P EST MOD 30 MIN: CPT | Performed by: FAMILY MEDICINE

## 2024-10-29 RX ORDER — PREDNISONE 20 MG/1
TABLET ORAL
Qty: 18 TABLET | Refills: 0 | Status: SHIPPED | OUTPATIENT
Start: 2024-10-29 | End: 2024-10-29

## 2024-10-29 RX ORDER — PREDNISONE 20 MG/1
TABLET ORAL
Qty: 12 TABLET | Refills: 0 | Status: SHIPPED | OUTPATIENT
Start: 2024-10-29 | End: 2024-11-04

## 2024-10-29 RX ORDER — CEFDINIR 300 MG/1
300 CAPSULE ORAL 2 TIMES DAILY
Qty: 20 CAPSULE | Refills: 0 | Status: SHIPPED | OUTPATIENT
Start: 2024-10-29

## 2024-10-29 NOTE — PROGRESS NOTES
My Chart/ Video/Telephone Visit Check-In    Lucy Mitchell and/or caregiver verbally consents a video Check-In service on 10/29/24.  Patient understands and accepts financial responsibility for any deductible, co-insurance and/or co-pays associated with this service.    Duration of the service including reviewing the chart, engaging with the patient and giving medical guidance: 11 minutes    Lucy Mitchell is a 54 year old female.    CC:  Rash    HPI:  Possible spiders bites. Skin lesions at the L temple, L hand and a L finger for about 3 days. Started while on vacation in [place]. She notes two puncture marks at the temple and hand lesions. The lesions did expand in size and now seem stable. Topical creams and oral Benadryl not helpful. NO fevers, myalgia or joint pains.   Allergies:  Allergies[1]   Current Meds:  Current Outpatient Medications   Medication Sig Dispense Refill    olmesartan 20 MG Oral Tab Take 1 tablet (20 mg total) by mouth daily. 90 tablet 1    HYDROcodone-acetaminophen (NORCO) 5-325 MG Oral Tab Take 1 tablet by mouth every 6 (six) hours as needed for Pain. 30 tablet 0    cyclobenzaprine 10 MG Oral Tab Take 1 tablet (10 mg total) by mouth 3 (three) times daily as needed for Muscle spasms. 60 tablet 0    Omeprazole 40 MG Oral Capsule Delayed Release Take 1 capsule (40 mg total) by mouth daily. 90 capsule 3    acidophilus-pectin Oral Cap Take 1 capsule by mouth daily.      aspirin (ST ANDREW ASPIRIN) 81 MG Oral Tab EC Take 1 tablet (81 mg total) by mouth daily.          History:  Past Medical History:    Anesthesia complication    slow to arouse; was on CPAP following surgery 6/2017 at [facility]; no issue with anesthesia for 7/31/17 surgery    Family history of factor V Leiden mutation    mother positive, patient Tested negative for the mutation    Hemorrhoids    History of kidney stones    passed on her own    History of seizures as a child    14y/o grand mal seizure x1; medication  for 1 year; no known cause; no longer on medication      Past Surgical History:   Procedure Laterality Date    Breast surgery Bilateral 2017    Lateral capsulorrhaphy and Paz pattern skin excision, 22 modifier     delivery only      Colonoscopy      Cystoscopy,remv ureteral stone  2023    Cystoscopy, Left Ureteroscopy with basket stone removal, Right Ureteroscopy Bilateral ureteral stent exchange    Hysterectomy      Total abdominal hysterectomy with salpingectomy    Hysteroscopy      Insert tissue expander(s) Bilateral 2017    133 MX-16-T 850 mL tissue expander     Mastectomy left Left 2017    Mastectomy right Right 2017    Other      Exploratory Lap    Other surgical history  2018    Left breast deep inferior epigastric  free flap reconstruction from right hemiabdomen, Removal of tissue expander, left breast, Extensive capsulectomy, left breast    Other surgical history  2018    Total capsulectomy right breast, Removal of right breast tissue expander, Delayed right breast reconstruction with EMPERATRIZ flap    Removal of kidney stone Bilateral 2023    Tonsillectomy      Total abdom hysterectomy        Family History   Problem Relation Age of Onset    Breast Cancer Mother         DCIS    Other (Other) Mother         Factor V leiden    Bleeding Disorders Mother         Factor V Leiden    Diabetes Mother     Hypertension Mother     Breast Cancer Sister         BRCA 2 positive, invasive ductal carcinoma     Heart Disorder Father     Mental Disorder Father         Bi-Polar    Breast Cancer Other         Aunt    Breast Cancer Sister       Family Status   Relation Status    Mo Alive    Sis Alive    Fa     Other (Not Specified)    Sis (Not Specified)      Social History     Socioeconomic History    Marital status: Single   Tobacco Use    Smoking status: Former     Current packs/day: 0.00     Average packs/day: 0.5 packs/day for 20.0 years (10.0 ttl  pk-yrs)     Types: Cigarettes     Start date: 10/11/1992     Quit date: 10/11/2012     Years since quittin.0    Smokeless tobacco: Former   Substance and Sexual Activity    Alcohol use: No    Drug use: No   Other Topics Concern    Caffeine Concern Yes     Comment: 1 cup coffee daily    Exercise No     Social Drivers of Health     Food Insecurity: No Food Insecurity (2023)    [facility], [facility]    Food Insecurity     Currently or in the past 3 months, have you worried your food would run out before you had money to buy more?: No     In the past 12 months, have you run out of food or been unable to get more?: No   Transportation Needs: No Transportation Needs (2023)    [facility] from [facility], [facility]    Transportation Needs     Medical Transportation Needs?: No    Received from [facility], [facility]    Social Connections    Received from [facility], [facility]    Housing Stability        ROS:  General: energy level stable       Physical Exam:  General: No apparent distress when conversing with me  Psych: Alert and oriented x 3, speech was not pressured  Resp: No respiratory distress noted when conversing with me, able to speak in full sentences.   Skin: L temple, L medial hand and L finger each with a near dime sized areas of erythema and microvesicles.                     ASSESSMENT AND PLAN    1. Skin lesions  Appear to be spider bites based on her history  Cefdinir for infection prophylaxis   Prednisone for possible allergic reaction to the bites.      No follow-ups on file.    Orders for this visit:    No orders of the defined types were placed in this encounter.      None    Meds & Refills for this Visit:  Requested Prescriptions     Signed Prescriptions Disp Refills    cefdinir 300 MG Oral Cap 20 capsule 0     Sig:  Take 1 capsule (300 mg total) by mouth 2 (two) times daily. X 10 days    predniSONE 20 MG Oral Tab 12 tablet 0     Sig: 3 qd x 2 days, then 2 qd x 2 days, then 1 qd x 2 days with food.             Authorized by Hector Lezama M.D.           [1] No Known Allergies

## 2024-11-04 RX ORDER — OLMESARTAN MEDOXOMIL 20 MG/1
20 TABLET ORAL DAILY
Qty: 90 TABLET | Refills: 0 | Status: SHIPPED | OUTPATIENT
Start: 2024-11-04

## 2025-01-16 ENCOUNTER — OFFICE VISIT (OUTPATIENT)
Dept: FAMILY MEDICINE CLINIC | Facility: CLINIC | Age: 55
End: 2025-01-16
Payer: COMMERCIAL

## 2025-01-16 VITALS
DIASTOLIC BLOOD PRESSURE: 80 MMHG | HEIGHT: 71 IN | SYSTOLIC BLOOD PRESSURE: 128 MMHG | WEIGHT: 293 LBS | OXYGEN SATURATION: 99 % | HEART RATE: 95 BPM | BODY MASS INDEX: 41.02 KG/M2 | TEMPERATURE: 98 F

## 2025-01-16 DIAGNOSIS — I10 HYPERTENSION, UNSPECIFIED TYPE: ICD-10-CM

## 2025-01-16 DIAGNOSIS — M19.019 SHOULDER ARTHRITIS: ICD-10-CM

## 2025-01-16 DIAGNOSIS — D64.9 ANEMIA, UNSPECIFIED TYPE: ICD-10-CM

## 2025-01-16 DIAGNOSIS — Z00.00 WELL ADULT EXAM: Primary | ICD-10-CM

## 2025-01-16 DIAGNOSIS — E78.5 HYPERLIPIDEMIA, UNSPECIFIED HYPERLIPIDEMIA TYPE: ICD-10-CM

## 2025-01-16 DIAGNOSIS — M54.50 MIDLINE LOW BACK PAIN, UNSPECIFIED CHRONICITY, UNSPECIFIED WHETHER SCIATICA PRESENT: ICD-10-CM

## 2025-01-16 DIAGNOSIS — I77.810 DILATION OF THORACIC AORTA (HCC): ICD-10-CM

## 2025-01-16 PROBLEM — E78.49 OTHER HYPERLIPIDEMIA: Status: ACTIVE | Noted: 2025-01-16

## 2025-01-16 LAB
ALT SERPL-CCNC: 12 U/L
ANION GAP SERPL CALC-SCNC: 9 MMOL/L (ref 0–18)
AST SERPL-CCNC: 18 U/L (ref ?–34)
BUN BLD-MCNC: 15 MG/DL (ref 9–23)
CALCIUM BLD-MCNC: 9.7 MG/DL (ref 8.7–10.6)
CHLORIDE SERPL-SCNC: 106 MMOL/L (ref 98–112)
CHOLEST SERPL-MCNC: 261 MG/DL (ref ?–200)
CO2 SERPL-SCNC: 25 MMOL/L (ref 21–32)
CREAT BLD-MCNC: 0.83 MG/DL
CRP SERPL HS-MCNC: 0.31 MG/L (ref ?–3)
EGFRCR SERPLBLD CKD-EPI 2021: 84 ML/MIN/1.73M2 (ref 60–?)
ERYTHROCYTE [DISTWIDTH] IN BLOOD BY AUTOMATED COUNT: 17.7 %
FASTING PATIENT LIPID ANSWER: YES
FASTING STATUS PATIENT QL REPORTED: YES
GLUCOSE BLD-MCNC: 83 MG/DL (ref 70–99)
HCT VFR BLD AUTO: 38.5 %
HDLC SERPL-MCNC: 44 MG/DL (ref 40–59)
HGB BLD-MCNC: 11.8 G/DL
LDLC SERPL CALC-MCNC: 196 MG/DL (ref ?–100)
MCH RBC QN AUTO: 24.5 PG (ref 26–34)
MCHC RBC AUTO-ENTMCNC: 30.6 G/DL (ref 31–37)
MCV RBC AUTO: 80 FL
NONHDLC SERPL-MCNC: 217 MG/DL (ref ?–130)
OSMOLALITY SERPL CALC.SUM OF ELEC: 290 MOSM/KG (ref 275–295)
PLATELET # BLD AUTO: 270 10(3)UL (ref 150–450)
POTASSIUM SERPL-SCNC: 3.9 MMOL/L (ref 3.5–5.1)
RBC # BLD AUTO: 4.81 X10(6)UL
SODIUM SERPL-SCNC: 140 MMOL/L (ref 136–145)
TRIGL SERPL-MCNC: 118 MG/DL (ref 30–149)
TSI SER-ACNC: 1.64 UIU/ML (ref 0.55–4.78)
VLDLC SERPL CALC-MCNC: 25 MG/DL (ref 0–30)
WBC # BLD AUTO: 6.4 X10(3) UL (ref 4–11)

## 2025-01-16 PROCEDURE — 3008F BODY MASS INDEX DOCD: CPT | Performed by: FAMILY MEDICINE

## 2025-01-16 PROCEDURE — 99396 PREV VISIT EST AGE 40-64: CPT | Performed by: FAMILY MEDICINE

## 2025-01-16 PROCEDURE — 85027 COMPLETE CBC AUTOMATED: CPT | Performed by: FAMILY MEDICINE

## 2025-01-16 PROCEDURE — 82728 ASSAY OF FERRITIN: CPT | Performed by: FAMILY MEDICINE

## 2025-01-16 PROCEDURE — 84443 ASSAY THYROID STIM HORMONE: CPT | Performed by: FAMILY MEDICINE

## 2025-01-16 PROCEDURE — 3074F SYST BP LT 130 MM HG: CPT | Performed by: FAMILY MEDICINE

## 2025-01-16 PROCEDURE — 3079F DIAST BP 80-89 MM HG: CPT | Performed by: FAMILY MEDICINE

## 2025-01-16 PROCEDURE — 80048 BASIC METABOLIC PNL TOTAL CA: CPT | Performed by: FAMILY MEDICINE

## 2025-01-16 PROCEDURE — 84460 ALANINE AMINO (ALT) (SGPT): CPT | Performed by: FAMILY MEDICINE

## 2025-01-16 PROCEDURE — 84450 TRANSFERASE (AST) (SGOT): CPT | Performed by: FAMILY MEDICINE

## 2025-01-16 PROCEDURE — 80061 LIPID PANEL: CPT | Performed by: FAMILY MEDICINE

## 2025-01-16 PROCEDURE — 86141 C-REACTIVE PROTEIN HS: CPT | Performed by: FAMILY MEDICINE

## 2025-01-16 RX ORDER — CIPROFLOXACIN 500 MG/1
500 TABLET, FILM COATED ORAL 2 TIMES DAILY
Qty: 20 TABLET | Refills: 0 | Status: SHIPPED | OUTPATIENT
Start: 2025-01-16 | End: 2025-01-26

## 2025-01-16 RX ORDER — CYCLOBENZAPRINE HCL 10 MG
10 TABLET ORAL 3 TIMES DAILY PRN
Qty: 60 TABLET | Refills: 0 | Status: SHIPPED | OUTPATIENT
Start: 2025-01-16

## 2025-01-16 RX ORDER — HYDROCODONE BITARTRATE AND ACETAMINOPHEN 5; 325 MG/1; MG/1
1 TABLET ORAL EVERY 6 HOURS PRN
Qty: 30 TABLET | Refills: 0 | Status: SHIPPED | OUTPATIENT
Start: 2025-01-16

## 2025-01-16 NOTE — PROGRESS NOTES
Lucy Mitchell is a 54 year old female.    CC:    Chief Complaint   Patient presents with    Physical     Reviewed Preventative/Wellness form with patient.         HPI:  Patient is here for yearly, wellness exam  Last Lipid:  Lab Results   Component Value Date    CHOLEST 262 (H) 09/19/2023    TRIG 105 09/19/2023    HDL 47 09/19/2023     (H) 09/19/2023    VLDL 22 09/19/2023    NONHDLC 215 (H) 09/19/2023   The 10-year ASCVD risk score (Kathi CORNELIUS, et al., 2019) is: 3.8%    Values used to calculate the score:      Age: 54 years      Sex: Female      Is Non- : No      Diabetic: No      Tobacco smoker: No      Systolic Blood Pressure: 128 mmHg      Is BP treated: Yes      HDL Cholesterol: 47 mg/dL      Total Cholesterol: 262 mg/dL      Last colon cancer screen:  Colonoscopy  07/24/2019 to be repeated 2029    Last mammo: na, s/p B mastectomy     Last Pap: na, s/p hysterectomy      Immunization History   Administered Date(s) Administered    TDAP 07/13/2020          Patient Active Problem List   Diagnosis    BRCA2 gene mutation positive: s/p B mastectomy    Left bundle branch block (LBBB): no intervention    Hypertension: ARB, no home BP to review    Dilation of thoracic aorta (HCC): 4.2 cm on CTA chest in 2023, follows with Cardiology    Other hyperlipidemia: no meds, CT heart score 7 in 2023, she is hoping to avoid statin therapy        Allergies as of 01/16/2025    (No Known Allergies)        Current Meds:  Current Outpatient Medications   Medication Sig Dispense Refill    cyclobenzaprine 10 MG Oral Tab Take 1 tablet (10 mg total) by mouth 3 (three) times daily as needed for Muscle spasms. 60 tablet 0    HYDROcodone-acetaminophen (NORCO) 5-325 MG Oral Tab Take 1 tablet by mouth every 6 (six) hours as needed for Pain. 30 tablet 0    ciprofloxacin 500 MG Oral Tab Take 1 tablet (500 mg total) by mouth 2 (two) times daily for 10 days. 20 tablet 0    olmesartan 20 MG Oral Tab Take 1  tablet (20 mg total) by mouth daily. 90 tablet 0    acidophilus-pectin Oral Cap Take 1 capsule by mouth daily.      aspirin (ST ANDREW ASPIRIN) 81 MG Oral Tab EC Take 1 tablet (81 mg total) by mouth daily.          History:  Past Medical History:    Anesthesia complication    slow to arouse; was on CPAP following surgery 2017 at [facility]; no issue with anesthesia for 17 surgery    Family history of factor V Leiden mutation    mother positive, patient Tested negative for the mutation    Hemorrhoids    History of kidney stones    passed on her own    History of seizures as a child    12y/o grand mal seizure x1; medication for 1 year; no known cause; no longer on medication      Past Surgical History:   Procedure Laterality Date    Breast surgery Bilateral 2017    Lateral capsulorrhaphy and Paz pattern skin excision, 22 modifier     delivery only      Colonoscopy      Cystoscopy,remv ureteral stone  2023    Cystoscopy, Left Ureteroscopy with basket stone removal, Right Ureteroscopy Bilateral ureteral stent exchange    Hysterectomy      Total abdominal hysterectomy with salpingectomy    Hysteroscopy      Insert tissue expander(s) Bilateral 2017    133 MX-16-T 850 mL tissue expander     Mastectomy left Left 2017    Mastectomy right Right 2017    Other      Exploratory Lap    Other surgical history  2018    Left breast deep inferior epigastric  free flap reconstruction from right hemiabdomen, Removal of tissue expander, left breast, Extensive capsulectomy, left breast    Other surgical history  2018    Total capsulectomy right breast, Removal of right breast tissue expander, Delayed right breast reconstruction with EMPERATRIZ flap    Tonsillectomy        Family History   Problem Relation Age of Onset    Breast Cancer Mother         DCIS    Other (Other) Mother         Factor V leiden    Bleeding Disorders Mother         Factor V Leiden    Diabetes Mother      Hypertension Mother     Breast Cancer Sister         BRCA 2 positive, invasive ductal carcinoma     Heart Disorder Father     Mental Disorder Father         Bi-Polar    Breast Cancer Other         Aunt    Breast Cancer Sister       Family Status   Relation Status    Mo Alive    Sis Alive    Fa     Other (Not Specified)    Sis (Not Specified)      Social History     Socioeconomic History    Marital status: Single   Tobacco Use    Smoking status: Former     Current packs/day: 0.00     Average packs/day: 0.5 packs/day for 20.0 years (10.0 ttl pk-yrs)     Types: Cigarettes     Start date: 10/11/1992     Quit date: 10/11/2012     Years since quittin.2    Smokeless tobacco: Former   Substance and Sexual Activity    Alcohol use: No    Drug use: No   Other Topics Concern    Caffeine Concern Yes     Comment: 1 cup coffee daily    Exercise No     Social Drivers of Health     Food Insecurity: No Food Insecurity (2025)    NCSS - Food Insecurity     Worried About Running Out of Food in the Last Year: No     Ran Out of Food in the Last Year: No   Transportation Needs: No Transportation Needs (2025)    NCSS - Transportation     Lack of Transportation: No    Received from [facility], [facility]    Social Connections   Housing Stability: Not At Risk (2025)    NCSS - Housing/Utilities     Has Housing: Yes     Worried About Losing Housing: No     Unable to Get Utilities: No        ROS:  General: energy level stable  MS: requests Flexeril and Norco refill for chronic low back and R shoulder pain  Misc: going to [place] soon, would like an antibiotic to take in case she gets infections    Vitals: /80   Pulse 95   Temp 97.9 °F (36.6 °C) (Temporal)   Ht 5' 11\" (1.803 m)   Wt 298 lb 9.6 oz (135.4 kg)   SpO2 99%   BMI 41.65 kg/m²    Reviewed by ANATOLY Lezama M.D.    Physical Exam:  GEN: well developed, well nourished, in no apparent distress  EYE: B conjunctiva  and lids normal  HENT: normocephalic; normal nose, pharynx and TM's  NECK: No lymphadenopathy, thyromegaly or masses  CAR: S1, S2 normal, RRR; no S3, no S4; no click; murmur negative  PULM: clear to auscultation B, no accessory muscle use  GI: normal active BS+, soft, nondistended; no HSM; no masses; no bruits; no masses; nontender, no G/R/R   PSYCH: alert and oriented x 3; affect appropriate  SKIN: not examined  EXTREMITIES: No clubbing, cyanosis or edema  GENITAL: not examined  LYMPH: no supraclavicular nodes  NEURO: Awake and alert. Normal speech and articulation. No facial droop or asymmetry. Moving all extremities equally. Symmetric B patellar DTRs    ASSESSMENT AND PLAN    1. Well adult exam  Await results   Colonoscopy in 2029  - ALT(SGPT)  - AST (SGOT)  - Basic Metabolic Panel (8)  - CBC, Platelet; No Differential  - Lipid Panel  - TSH W Reflex To Free T4  - C-RP/High Sensitivity [E]    2. Midline low back pain, unspecified chronicity, unspecified whether sciatica present    - cyclobenzaprine 10 MG Oral Tab; Take 1 tablet (10 mg total) by mouth 3 (three) times daily as needed for Muscle spasms.  Dispense: 60 tablet; Refill: 0  - HYDROcodone-acetaminophen (NORCO) 5-325 MG Oral Tab; Take 1 tablet by mouth every 6 (six) hours as needed for Pain.  Dispense: 30 tablet; Refill: 0    3. Shoulder arthritis    - HYDROcodone-acetaminophen (NORCO) 5-325 MG Oral Tab; Take 1 tablet by mouth every 6 (six) hours as needed for Pain.  Dispense: 30 tablet; Refill: 0    4. Dilation of thoracic aorta (HCC)  Await results   - Lipid Panel; Future  - Lipid Panel    5. Hypertension, unspecified type  Stable   Continue present medications   Await results     - Basic Metabolic Panel (8)  - Lipid Panel  - C-RP/High Sensitivity [E]    6. Hyperlipidemia, unspecified hyperlipidemia type  Await results     - Lipid Panel  - C-RP/High Sensitivity [E]      No follow-ups on file.    Orders for this visit:    Orders Placed This Encounter    Procedures    ALT(SGPT)     Standing Status:   Future     Number of Occurrences:   1     Standing Expiration Date:   1/16/2026    AST (SGOT)     Standing Status:   Future     Number of Occurrences:   1     Standing Expiration Date:   1/16/2026    Basic Metabolic Panel (8)     Standing Status:   Future     Number of Occurrences:   1     Standing Expiration Date:   1/16/2026    CBC, Platelet; No Differential     Standing Status:   Future     Number of Occurrences:   1     Standing Expiration Date:   1/16/2026    Lipid Panel     Standing Status:   Future     Number of Occurrences:   1     Standing Expiration Date:   1/16/2026    TSH W Reflex To Free T4     Standing Status:   Future     Number of Occurrences:   1     Standing Expiration Date:   1/16/2026    C-RP/High Sensitivity [E]     Standing Status:   Future     Number of Occurrences:   1     Standing Expiration Date:   1/16/2026     Order Specific Question:   Release to patient     Answer:   Immediate    VENIPUNCTURE     Order Specific Question:   Release to patient     Answer:   Immediate       None    Meds & Refills for this Visit:  Requested Prescriptions     Signed Prescriptions Disp Refills    cyclobenzaprine 10 MG Oral Tab 60 tablet 0     Sig: Take 1 tablet (10 mg total) by mouth 3 (three) times daily as needed for Muscle spasms.    HYDROcodone-acetaminophen (NORCO) 5-325 MG Oral Tab 30 tablet 0     Sig: Take 1 tablet by mouth every 6 (six) hours as needed for Pain.    ciprofloxacin 500 MG Oral Tab 20 tablet 0     Sig: Take 1 tablet (500 mg total) by mouth 2 (two) times daily for 10 days.             Authorized by Hector Lezama M.D.

## 2025-01-17 ENCOUNTER — TELEPHONE (OUTPATIENT)
Dept: FAMILY MEDICINE CLINIC | Facility: CLINIC | Age: 55
End: 2025-01-17

## 2025-01-17 DIAGNOSIS — D50.9 IRON DEFICIENCY ANEMIA, UNSPECIFIED IRON DEFICIENCY ANEMIA TYPE: Primary | ICD-10-CM

## 2025-01-17 DIAGNOSIS — D64.9 ANEMIA, UNSPECIFIED TYPE: Primary | ICD-10-CM

## 2025-01-17 LAB — DEPRECATED HBV CORE AB SER IA-ACNC: 6 NG/ML

## 2025-01-17 NOTE — TELEPHONE ENCOUNTER
Patient's name and  verified     Patient donates blood- double red every 16 weeks for past year. Could this be causing the problem?    Patient is leaving for [facility] first of February, can GI doctor wait until she gets back?    Could she just take some iron supplements until then?    Please Advise

## 2025-01-17 NOTE — TELEPHONE ENCOUNTER
Let's have her retest a day or so before she donates again.  Order placed. Can be done at reference labs  Thanks

## 2025-01-17 NOTE — TELEPHONE ENCOUNTER
Yep, that is likely the culprit. When did she last donate?  I would have her take an OTC ferrous sulfate 325 mg pill once per day with a glass of OJ.  Thanks

## 2025-01-17 NOTE — TELEPHONE ENCOUNTER
Patient's name and  verified     Patient donated , May and August of this last year.   Patient wants to know when she should re test.   Patient leaves for [place] beginning of February    Please Advise

## 2025-02-26 ENCOUNTER — TELEPHONE (OUTPATIENT)
Dept: FAMILY MEDICINE CLINIC | Facility: CLINIC | Age: 55
End: 2025-02-26

## 2025-02-26 NOTE — TELEPHONE ENCOUNTER
Patient due for labs. [facility] sent to patient.  Future Appointments   Date Time Provider Department Center   4/16/2025  3:40 PM Meño Ferrer MD [facility]

## 2025-08-29 ENCOUNTER — APPOINTMENT (OUTPATIENT)
Dept: CT IMAGING | Age: 55
End: 2025-08-29
Attending: NURSE PRACTITIONER

## 2025-08-29 ENCOUNTER — HOSPITAL ENCOUNTER (OUTPATIENT)
Age: 55
Discharge: HOME | End: 2025-08-29

## 2025-08-29 ENCOUNTER — TELEPHONE (OUTPATIENT)
Dept: FAMILY MEDICINE CLINIC | Facility: CLINIC | Age: 55
End: 2025-08-29

## 2025-08-29 ENCOUNTER — APPOINTMENT (OUTPATIENT)
Dept: LAB | Age: 55
End: 2025-08-29

## 2025-08-29 VITALS
SYSTOLIC BLOOD PRESSURE: 132 MMHG | BODY MASS INDEX: 40.6 KG/M2 | DIASTOLIC BLOOD PRESSURE: 84 MMHG | HEART RATE: 72 BPM | WEIGHT: 290 LBS | TEMPERATURE: 98 F | OXYGEN SATURATION: 99 % | HEIGHT: 71 IN | RESPIRATION RATE: 16 BRPM

## 2025-08-29 DIAGNOSIS — K92.1 HEMATOCHEZIA: Primary | ICD-10-CM

## 2025-08-29 DIAGNOSIS — K52.9 ACUTE COLITIS: ICD-10-CM

## 2025-08-29 LAB
#MXD IC: 0.9 X10ˆ3/UL (ref 0.1–1)
BUN BLD-MCNC: 10 MG/DL (ref 7–18)
CHLORIDE BLD-SCNC: 104 MMOL/L (ref 98–112)
CO2 BLD-SCNC: 24 MMOL/L (ref 21–32)
CREAT BLD-MCNC: 1 MG/DL (ref 0.55–1.02)
EGFRCR SERPLBLD CKD-EPI 2021: 67 ML/MIN/1.73M2 (ref 60–?)
GLUCOSE BLD-MCNC: 107 MG/DL (ref 70–99)
HCT VFR BLD AUTO: 43.2 % (ref 35–48)
HCT VFR BLD CALC: 44 % (ref 34–50)
HGB BLD-MCNC: 13.9 G/DL (ref 12–16)
ISTAT IONIZED CALCIUM FOR CHEM 8: 1.22 MMOL/L (ref 1.12–1.32)
LYMPHOCYTES # BLD AUTO: 2.9 X10ˆ3/UL (ref 1–4)
LYMPHOCYTES NFR BLD AUTO: 29.5 %
MCH RBC QN AUTO: 27.7 PG (ref 26–34)
MCHC RBC AUTO-ENTMCNC: 32.2 G/DL (ref 31–37)
MCV RBC AUTO: 86.1 FL (ref 80–100)
MIXED CELL %: 8.7 %
NEUTROPHILS # BLD AUTO: 6.1 X10ˆ3/UL (ref 1.5–7.7)
NEUTROPHILS NFR BLD AUTO: 61.8 %
PLATELET # BLD AUTO: 268 X10ˆ3/UL (ref 150–450)
POTASSIUM BLD-SCNC: 4.3 MMOL/L (ref 3.6–5.1)
RBC # BLD AUTO: 5.02 X10ˆ6/UL (ref 3.8–5.3)
SODIUM BLD-SCNC: 141 MMOL/L (ref 136–145)
WBC # BLD AUTO: 9.9 X10ˆ3/UL (ref 4–11)

## 2025-08-29 PROCEDURE — 74177 CT ABD & PELVIS W/CONTRAST: CPT | Performed by: NURSE PRACTITIONER

## 2025-08-29 RX ORDER — PANTOPRAZOLE SODIUM 40 MG/1
40 TABLET, DELAYED RELEASE ORAL DAILY
Qty: 30 TABLET | Refills: 0 | Status: SHIPPED | OUTPATIENT
Start: 2025-08-29 | End: 2025-09-28

## 2025-08-29 RX ORDER — IOPAMIDOL 755 MG/ML
100 INJECTION, SOLUTION INTRAVASCULAR
Status: COMPLETED | OUTPATIENT
Start: 2025-08-29 | End: 2025-08-29

## 2025-08-29 RX ORDER — SODIUM CHLORIDE 9 MG/ML
1000 INJECTION, SOLUTION INTRAVENOUS ONCE
Status: COMPLETED | OUTPATIENT
Start: 2025-08-29 | End: 2025-08-29

## (undated) DEVICE — MINOR GENERAL: Brand: MEDLINE INDUSTRIES, INC.

## (undated) DEVICE — CAUTERY BLADE 2IN INS E1455

## (undated) DEVICE — TOWEL OR BLU 16X26 STRL

## (undated) DEVICE — SOL  .9 1000ML BTL

## (undated) DEVICE — SPONGE LAP 18X18 XRAY STRL

## (undated) DEVICE — Device

## (undated) DEVICE — STERILE LATEX POWDER-FREE SURGICAL GLOVESWITH NITRILE COATING: Brand: PROTEXIS

## (undated) DEVICE — SUTURE PROLENE 4-0 PS-2

## (undated) DEVICE — LIGACLIP MCA MULTIPLE CLIP APPLIERS, 30 MEDIUM CLIPS: Brand: LIGACLIP

## (undated) DEVICE — SUTURE MONOCRYL 4-0 PS-2

## (undated) DEVICE — INTENDED FOR TISSUE SEPARATION, AND OTHER PROCEDURES THAT REQUIRE A SHARP SURGICAL BLADE TO PUNCTURE OR CUT.: Brand: BARD-PARKER ® STAINLESS STEEL BLADES

## (undated) DEVICE — CHLORAPREP 26ML APPLICATOR

## (undated) DEVICE — LAMINECTOMY ARM CRADLE FOAM POSITIONER: Brand: CARDINAL HEALTH

## (undated) DEVICE — FREE FLAP: Brand: MEDLINE INDUSTRIES, INC.

## (undated) DEVICE — DRESSING FOAM TOPIFOAM

## (undated) DEVICE — 60 ML SYRINGE LUER-LOCK TIP: Brand: MONOJECT

## (undated) DEVICE — COVER STND 54X23IN MAYO REINF

## (undated) DEVICE — FLEXIBLE YANKAUER,MEDIUM TIP, NO VACUUM CONTROL: Brand: ARGYLE

## (undated) DEVICE — RETRACTOR LONE STAR STAYS BLNT

## (undated) DEVICE — DRAIN RELIAVAC 100CC

## (undated) DEVICE — PACK SRG UNV 1 STRL LF

## (undated) DEVICE — PIN SAFETY STERILE (2/PK)

## (undated) DEVICE — SUCTION CANISTER, 3000CC,SAFELINER: Brand: DEROYAL

## (undated) DEVICE — SUTURE VICRYL 0 J340H

## (undated) DEVICE — VIOPTIX DISPOSABLE SENSOR

## (undated) DEVICE — GAUZE SPONGES,12 PLY: Brand: CURITY

## (undated) DEVICE — SUTURE CHROMIC GUT 3-0 SH

## (undated) DEVICE — SUTURE ETHILON 3-0 PS-2

## (undated) DEVICE — SUTURE VICRYL 3-0 SH

## (undated) DEVICE — SUTURE VICRYL 0 CT-1

## (undated) DEVICE — GOWN SRG LG IMPRV BRTHBL STRL

## (undated) DEVICE — BLAKE SILICONE DRAIN, 15 FR ROUND, HUBLESS WITH 3/16" TROCAR: Brand: BLAKE

## (undated) DEVICE — BANDAGE ROLL,100% COTTON, 6 PLY, LARGE: Brand: KERLIX

## (undated) DEVICE — SUTURE VICRYL 2-0 CT-1

## (undated) DEVICE — GOWN,PREVENTION PLUS,LARGE,STERILE: Brand: MEDLINE

## (undated) DEVICE — SUTURE ETHILON 3-0 PS-1

## (undated) DEVICE — SYRINGE 10ML LL TIP

## (undated) DEVICE — CAUTERY TIP TEFLON MEGADYNE

## (undated) DEVICE — DRESSING BIOPATCH 1X4 CNTR

## (undated) DEVICE — 1010 S-DRAPE TOWEL DRAPE 10/BX: Brand: STERI-DRAPE™

## (undated) DEVICE — Device: Brand: MEDEX

## (undated) DEVICE — LAPAROTOMY SPONGE - RF AND X-RAY DETECTABLE PRE-WASHED: Brand: SITUATE

## (undated) DEVICE — UNDYED BRAIDED (POLYGLACTIN 910), SYNTHETIC ABSORBABLE SUTURE: Brand: COATED VICRYL

## (undated) DEVICE — MAJOR GENERAL: Brand: MEDLINE INDUSTRIES, INC.

## (undated) DEVICE — CLIP SM INTNL HMCLP TNTLM ESCP

## (undated) DEVICE — MARKER SKIN PREP RESIST STRL

## (undated) DEVICE — VEST SRG 5 XL CUP R/L

## (undated) DEVICE — BRA ZIPPERED SYTLE 958 X-LG

## (undated) DEVICE — HEMOCLIP HORIZON LG 004200

## (undated) DEVICE — SUTURE ETHILON 2-0 FS

## (undated) DEVICE — GLOVE SURG SENSICARE SZ 6-1/2

## (undated) DEVICE — PROXIMATE RH ROTATING HEAD SKIN STAPLERS (35 WIDE) CONTAINS 35 STAINLESS STEEL STAPLES: Brand: PROXIMATE

## (undated) DEVICE — PLASTIC BREAST CDS-LF: Brand: MEDLINE INDUSTRIES, INC.

## (undated) DEVICE — MICRO CLIP GOLD

## (undated) DEVICE — DERMABOND LIQUID ADHESIVE

## (undated) DEVICE — SUPER SPONGES,MEDIUM: Brand: KERLIX

## (undated) DEVICE — CHLORAPREP ORANGE TINT 10.5ML

## (undated) DEVICE — SUTURE PDS II 2-0 CT-2

## (undated) DEVICE — 3M™ STERI-DRAPE™ INSTRUMENT POUCH 1018: Brand: STERI-DRAPE™

## (undated) DEVICE — VIOLET BRAIDED (POLYGLACTIN 910), SYNTHETIC ABSORBABLE SUTURE: Brand: COATED VICRYL

## (undated) DEVICE — USE ITEM #176901

## (undated) DEVICE — PEN: MARKING STD PT 100/CS: Brand: MEDICAL ACTION INDUSTRIES

## (undated) DEVICE — BIPOLAR FORCEPS CORD,BANANA LEADS: Brand: VALLEYLAB

## (undated) DEVICE — SUTURE VICRYL 5-0 PS-5

## (undated) DEVICE — 9534HP TRANSPARENT DRSG W/FRAME: Brand: 3M™ TEGADERM™

## (undated) DEVICE — BANDAID COVERLET 1X3

## (undated) DEVICE — LIGACLIP MCA MULTIPLE CLIP APPLIERS, 20 SMALL CLIPS: Brand: LIGACLIP

## (undated) DEVICE — SUPERFINE MICRO CLIP BLUE

## (undated) DEVICE — DRAIN BLAKE ROUND 15FR

## (undated) DEVICE — SUTURE ETHILON 3-0 669H

## (undated) DEVICE — GLOVE SURG SENSICARE SZ 7

## (undated) DEVICE — 3M™ STERI-STRIP™ REINFORCED ADHESIVE SKIN CLOSURES, R1547, 1/2 IN X 4 IN (12 MM X 100 MM), 6 STRIPS/ENVELOPE: Brand: 3M™ STERI-STRIP™

## (undated) DEVICE — TOWEL: OR BLU 80/CS: Brand: MEDICAL ACTION INDUSTRIES

## (undated) DEVICE — KENDALL SCD EXPRESS SLEEVES, KNEE LENGTH, MEDIUM: Brand: KENDALL SCD

## (undated) DEVICE — DRAPE SHEET LG

## (undated) DEVICE — SUTURE VICRYL 4-0 RB-1

## (undated) DEVICE — REM POLYHESIVE ADULT PATIENT RETURN ELECTRODE: Brand: VALLEYLAB

## (undated) DEVICE — CAUTERY VASECTOMY

## (undated) DEVICE — DRAPE MICROSCOPE LEICA

## (undated) DEVICE — SUTURE PDS II 2-0 SH

## (undated) DEVICE — VEST SRG 3 MED CUP R/L

## (undated) DEVICE — COVER SGL STRL LGHT HNDL BLU

## (undated) DEVICE — 1910 FOAM BLOCK NEEDLE COUNTER: Brand: DEVON

## (undated) DEVICE — SUTURE PROLENE 1 CT-1

## (undated) DEVICE — SYRINGE 10ML LL CONTRL SYRINGE

## (undated) DEVICE — PENCIL ESURG 10FT 3/32IN SS

## (undated) DEVICE — MARKER SKIN 2 TIP

## (undated) DEVICE — BLADE 11 SHRP BP SS SRG STRL

## (undated) DEVICE — E-Z CLEAN, NON-STICK, PTFE COATED, ELECTROSURGICAL BLADE ELECTRODE, MODIFIED EXTENDED INSULATION, 2.5 INCH (6.35 CM): Brand: MEGADYNE

## (undated) DEVICE — SPONGE: SPECIALTY PEANUT XR 100/CS: Brand: MEDICAL ACTION INDUSTRIES

## (undated) DEVICE — DRAPE EQUIPMENT INTRATEMP THER

## (undated) DEVICE — 3M™ IOBAN™ 2 ANTIMICROBIAL INCISE DRAPE 6648EZ: Brand: IOBAN™ 2

## (undated) DEVICE — SPONGE RAYTEC 4X4 RF DETECT

## (undated) DEVICE — GLOVE SURG SENSICARE SZ 7-1/2

## (undated) DEVICE — DRAIN RESERVOIR RELIAVAC 100CC

## (undated) DEVICE — SUTURE PROLENE 4-0 RB-1

## (undated) DEVICE — CSTM UNIVERSAL DRAPE PK: Brand: MEDLINE INDUSTRIES, INC.

## (undated) DEVICE — STANDARD HYPODERMIC NEEDLE,POLYPROPYLENE HUB: Brand: MONOJECT

## (undated) DEVICE — 40580 - THE PINK PAD - ADVANCED TRENDELENBURG POSITIONING KIT: Brand: 40580 - THE PINK PAD - ADVANCED TRENDELENBURG POSITIONING KIT

## (undated) DEVICE — 3M™ IOBAN™ 2 ANTIMICROBIAL INCISE DRAPE 6650EZ: Brand: IOBAN™ 2

## (undated) DEVICE — ADHESIVE MASTISOL 2/3CC VL

## (undated) DEVICE — SUTURE SILK 2-0 685H

## (undated) DEVICE — 3M(TM) TEGADERM(TM) TRANSPARENT FILM DRESSING FRAME STYLE 9505W: Brand: 3M™ TEGADERM™

## (undated) DEVICE — CG INFILTRATION TUBING: Brand: CG INFILTRATION TUBING

## (undated) DEVICE — 3M™ TEGADERM™ TRANSPARENT FILM DRESSING, 1626W, 4 IN X 4-3/4 IN (10 CM X 12 CM), 50 EACH/CARTON, 4 CARTON/CASE: Brand: 3M™ TEGADERM™

## (undated) DEVICE — CLIP MED INTNL HMCLP TNTLM

## (undated) DEVICE — SUTURE PROLENE 8-0 BV-130-5

## (undated) DEVICE — STERILE SYNTHETIC POLYISOPRENE POWDER-FREE SURGICAL GLOVES WITH HYDROGEL COATING: Brand: PROTEXIS

## (undated) DEVICE — DECANTER BAG 9": Brand: MEDLINE INDUSTRIES, INC.

## (undated) DEVICE — BLANKET HYPOTHERMIA ADULT

## (undated) DEVICE — BLADE ELECTROSURG 4IN INSULATE

## (undated) NOTE — LETTER
[address]  [address] [address]  [facility]: [phone]  FAX: [phone]         Medical Clearance Request    [phone]    The patient listed below is scheduled for surgery and needs the following pre

## (undated) NOTE — LETTER
[facility]  Administration of Anesthesia  1.  Desi Devries, or _________________________________ acting on her behalf, (Patient) (Dependent/Representative) request to receive anesthesia for my pending procedure/operation/treatmen infections, high spinal block, spinal bleeding, seizure, cardiac arrest and death. 7. AWARENESS: I understand that it is possible (but unlikely) to have explicit memory of events from the operating room while under general anesthesia.   8. ELECTROCONVULSIV (Date) (Time)                                                                                               (Responsible person in case of minor/ unconscious pt) /Relationship    My signature below affirms that prior to the time of the procedure, I have ex

## (undated) NOTE — LETTER
[address], [place], [place]     AUTHORIZATION FOR SURGICAL OPERATION OR PROCEDURE    1.  I hereby authorize Dr. Bam Pacheco MD, my Physician(s) and whomever may be designated as the doctor's Assistant, to perform th 5. I consent to the photographing of procedure(s) to be performed for the purposes of advancing medicine, science and/or education, provided my identity is not revealed.  If the procedure has been videotaped, the physician/surgeon will obtain the original v (Witness signature)                                                                                                  (Date)                                (Time)  STATEMENT OF PHYSICIAN My signature below affirms that prior to the time of the procedure;  I

## (undated) NOTE — LETTER
Mirna Colon 182 [address] [facility] [address], [address] [facility]  Authorization for Surgical Operation and Procedure   Date:___________                                                                                            Time:__________  1.  I hereby aut 4.   Should the need arise during my operation or immediate post-operative period, I also consent to the administration of blood and/or blood products.   Further, I understand that despite careful testing and screening of blood or blood products by kristen 8.   I recognize that in the event my procedure results in extended X-Ray/fluoroscopy time, I may develop a skin reaction. 9.  If I have a Do Not Attempt Resuscitation (DNAR) order in place, that status will be suspended while in the operating room, proc

## (undated) NOTE — LETTER
[facility]  Chad Martell 61 [address] [facility], [address] [place] [address]    Consent for Operation    Date: __________________    Time: _______________    1.  I authorize the performance upon Maryse Jones the following operation:    Procedure(s):  Delayed bilater procedures to be performed, including appropriate portions of my body for medical, scientific, or educational purposes, provided my identity is not revealed by the pictures or by descriptive texts accompanying them.  If the procedure has been videotaped, th ends for purposes of reinstating the Do Not Resuscitate order.     I CERTIFY THAT I HAVE READ AND UNDERSTAND THE ABOVE CONSENT TO OPERATION, THAT MY DOCTOR PROVIDED ME WITH THE ABOVE EXPLANATIONS, THAT ALL BLANKS OR STATEMENTS REQUIRING INSERTION OR COMPLET akhil Martinez my anesthesia doctor before my procedure:  · If I am pregnant. · The last time that I ate or drank.   · All of the medicines I take (including prescriptions, herbal supplements, and pills I can buy without a prescription (including street drugs/ill _____________________________________________________________________________   Name (if used)    Language/Organization   Time    _____________________________________________________________________________  Anesthesiologist Signature     Date

## (undated) NOTE — LETTER
Mirna Colon 182  [address] [facility] [address], [address] [facility]  Authorization for Surgical Operation and Procedure     Date:___________                                                                                                         Time:__________ 4.   Should the need arise during my operation or immediate post-operative period, I also consent to the administration of blood and/or blood products.   Further, I understand that despite careful testing and screening of blood or blood products by kristen 8.   I recognize that in the event my procedure results in extended X-Ray/fluoroscopy time, I may develop a skin reaction. 9.  If I have a Do Not Attempt Resuscitation (DNAR) order in place, that status will be suspended while in the operating room, proc 1. IPavan agree to be cared for by an anesthesiologist, who is specially trained to monitor me and give me medicine to put me to sleep or keep me comfortable during my procedure    I understand that my anesthesiologist is not an employee o 5. My doctor has explained to me other choices available to me for my care (alternatives).   6. Pregnant Patients (“epidural”):  I understand that the risks of having an epidural (medicine given into my back to help control pain during labor), include itchi

## (undated) NOTE — Clinical Note
[facility]  [address] [facility], [address] [place] [address]:  [phone]  FAX:  [phone]    2017    Patient: Anna Oswaldo  : 1970     Dear

## (undated) NOTE — MR AVS SNAPSHOT
[facility]  [address]  [address], [address] [phone]                    After Visit Summary   6/16/2017    Ed Zahira    MRN: [id number]           Visit Information        Provider Department Dept Phone    6/1 You can access your MyChart to more actively manage your health care and view more details from this visit by going to [email or web address].   If you've recently had a stay at the Hospital you can access your discharge instructions in [address] by radha

## (undated) NOTE — LETTER
Sandi Lord [address], [place] [address], [place]  [address]  INFORMED CONSENT FOR TRANSFUSION OF BLOOD OR BLOOD PRODUCTS   My physician has informed me of the nature, purpose, benefits and risks of transfusion for blood and blood components victorino (Signature of Patient)                                                           (Responsible party in case of Minor,                                                                                                Incompetent, or unconscious Patient)  _____

## (undated) NOTE — LETTER
[address], [place], [place]     AUTHORIZATION FOR SURGICAL OPERATION OR PROCEDURE    1.  I hereby authorize Dr. Severo Spry MD, my Physician(s) and whomever may be designated as the doctor's Assistant, to perform the foll own blood, or a directed donor transfusion, I will discuss this with my Physician. 5. I consent to the photographing of procedure(s) to be performed for the purposes of advancing medicine, science and/or education, provided my identity is not revealed.  If _______________________________________________________________ ____________________________  (Witness signature)                                                                                                  (Date)                                (Time)

## (undated) NOTE — LETTER
[facility]  [address] [place] [address], [address] [place] [address]:  [phone]  FAX:  [phone]    18    Patient: Paris Uriel  : 1970     Aldair Ashby